# Patient Record
Sex: FEMALE | Race: WHITE | Employment: FULL TIME | URBAN - METROPOLITAN AREA
[De-identification: names, ages, dates, MRNs, and addresses within clinical notes are randomized per-mention and may not be internally consistent; named-entity substitution may affect disease eponyms.]

---

## 2018-12-31 ENCOUNTER — HOSPITAL ENCOUNTER (EMERGENCY)
Age: 23
Discharge: HOME | End: 2018-12-31
Attending: EMERGENCY MEDICINE

## 2018-12-31 VITALS
SYSTOLIC BLOOD PRESSURE: 125 MMHG | OXYGEN SATURATION: 98 % | RESPIRATION RATE: 16 BRPM | HEART RATE: 95 BPM | TEMPERATURE: 98.4 F | BODY MASS INDEX: 45.02 KG/M2 | WEIGHT: 222.88 LBS | DIASTOLIC BLOOD PRESSURE: 89 MMHG

## 2018-12-31 DIAGNOSIS — N39.0 ACUTE UTI: Primary | ICD-10-CM

## 2018-12-31 LAB
BACTERIA: ABNORMAL /HPF
BILIRUBIN URINE: NEGATIVE
BLOOD, URINE: NEGATIVE
CLARITY: CLEAR
COLOR: ABNORMAL
EPITHELIAL CELLS, UA: ABNORMAL /HPF
GLUCOSE URINE: NEGATIVE MG/DL
HCG(URINE) PREGNANCY TEST: NEGATIVE
KETONES, URINE: NEGATIVE MG/DL
LEUKOCYTE ESTERASE, URINE: ABNORMAL
MICROSCOPIC EXAMINATION: YES
NITRITE, URINE: NEGATIVE
PH UA: 6
PROTEIN UA: NEGATIVE MG/DL
RBC UA: ABNORMAL /HPF (ref 0–2)
SPECIFIC GRAVITY UA: 1.02
URINE REFLEX TO CULTURE: YES
URINE TYPE: ABNORMAL
UROBILINOGEN, URINE: 0.2 E.U./DL
WBC UA: ABNORMAL /HPF (ref 0–5)

## 2018-12-31 PROCEDURE — 87077 CULTURE AEROBIC IDENTIFY: CPT

## 2018-12-31 PROCEDURE — 84703 CHORIONIC GONADOTROPIN ASSAY: CPT

## 2018-12-31 PROCEDURE — 81001 URINALYSIS AUTO W/SCOPE: CPT

## 2018-12-31 PROCEDURE — 87186 SC STD MICRODIL/AGAR DIL: CPT

## 2018-12-31 PROCEDURE — 87086 URINE CULTURE/COLONY COUNT: CPT

## 2018-12-31 PROCEDURE — 99283 EMERGENCY DEPT VISIT LOW MDM: CPT

## 2018-12-31 RX ORDER — PHENAZOPYRIDINE HYDROCHLORIDE 100 MG/1
100 TABLET, FILM COATED ORAL 3 TIMES DAILY PRN
Qty: 9 TABLET | Refills: 0 | Status: SHIPPED | OUTPATIENT
Start: 2018-12-31 | End: 2019-01-03

## 2018-12-31 RX ORDER — CEFUROXIME AXETIL 250 MG/1
250 TABLET ORAL 2 TIMES DAILY
Qty: 10 TABLET | Refills: 0 | Status: SHIPPED | OUTPATIENT
Start: 2018-12-31 | End: 2019-01-05

## 2019-01-03 LAB
ORGANISM: ABNORMAL
URINE CULTURE, ROUTINE: ABNORMAL
URINE CULTURE, ROUTINE: ABNORMAL

## 2019-04-24 ENCOUNTER — APPOINTMENT (OUTPATIENT)
Dept: GENERAL RADIOLOGY | Age: 24
End: 2019-04-24
Payer: MEDICAID

## 2019-04-24 ENCOUNTER — HOSPITAL ENCOUNTER (EMERGENCY)
Age: 24
Discharge: HOME | End: 2019-04-25
Attending: EMERGENCY MEDICINE
Payer: MEDICAID

## 2019-04-24 DIAGNOSIS — S82.851A CLOSED RIGHT TRIMALLEOLAR FRACTURE, INITIAL ENCOUNTER: ICD-10-CM
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
## 2019-04-24 LAB
ANION GAP SERPL CALCULATED.3IONS-SCNC: 11 MMOL/L (ref 3–16)
BUN BLDV-MCNC: 17 MG/DL (ref 7–20)
CALCIUM SERPL-MCNC: 9.5 MG/DL (ref 8.3–10.6)
CHLORIDE BLD-SCNC: 102 MMOL/L (ref 99–110)
CO2: 24 MMOL/L (ref 21–32)
CREAT SERPL-MCNC: 0.7 MG/DL (ref 0.6–1.1)
GFR AFRICAN AMERICAN: >60
GFR NON-AFRICAN AMERICAN: >60
GLUCOSE BLD-MCNC: 124 MG/DL (ref 70–99)
HCG QUALITATIVE: NEGATIVE
HCT VFR BLD CALC: 40.2 % (ref 36–48)
HEMOGLOBIN: 12.9 G/DL (ref 12–16)
MCH RBC QN AUTO: 26.9 PG (ref 26–34)
MCHC RBC AUTO-ENTMCNC: 32.1 G/DL (ref 31–36)
MCV RBC AUTO: 83.7 FL (ref 80–100)
PDW BLD-RTO: 14.4 % (ref 12.4–15.4)
PLATELET # BLD: 365 K/UL (ref 135–450)
PMV BLD AUTO: 7.8 FL (ref 5–10.5)
POTASSIUM SERPL-SCNC: 4.7 MMOL/L (ref 3.5–5.1)
RBC # BLD: 4.81 M/UL (ref 4–5.2)
SODIUM BLD-SCNC: 137 MMOL/L (ref 136–145)
WBC # BLD: 14.1 K/UL (ref 4–11)

## 2019-04-24 PROCEDURE — 96375 TX/PRO/DX INJ NEW DRUG ADDON: CPT

## 2019-04-24 PROCEDURE — 99284 EMERGENCY DEPT VISIT MOD MDM: CPT

## 2019-04-24 PROCEDURE — 6360000002 HC RX W HCPCS: Performed by: EMERGENCY MEDICINE

## 2019-04-24 PROCEDURE — 96374 THER/PROPH/DIAG INJ IV PUSH: CPT

## 2019-04-24 PROCEDURE — 73630 X-RAY EXAM OF FOOT: CPT

## 2019-04-24 PROCEDURE — 4500000024 HC ED LEVEL 4 PROCEDURE

## 2019-04-24 PROCEDURE — 85027 COMPLETE CBC AUTOMATED: CPT

## 2019-04-24 PROCEDURE — 80048 BASIC METABOLIC PNL TOTAL CA: CPT

## 2019-04-24 PROCEDURE — 36415 COLL VENOUS BLD VENIPUNCTURE: CPT

## 2019-04-24 PROCEDURE — 73560 X-RAY EXAM OF KNEE 1 OR 2: CPT

## 2019-04-24 PROCEDURE — 73502 X-RAY EXAM HIP UNI 2-3 VIEWS: CPT

## 2019-04-24 PROCEDURE — 73610 X-RAY EXAM OF ANKLE: CPT

## 2019-04-24 PROCEDURE — 84703 CHORIONIC GONADOTROPIN ASSAY: CPT

## 2019-04-24 RX ORDER — MORPHINE SULFATE 4 MG/ML
4 INJECTION, SOLUTION INTRAMUSCULAR; INTRAVENOUS ONCE
Status: COMPLETED | OUTPATIENT
Start: 2019-04-24 | End: 2019-04-24

## 2019-04-24 RX ORDER — ONDANSETRON 2 MG/ML
4 INJECTION INTRAMUSCULAR; INTRAVENOUS ONCE
Status: COMPLETED | OUTPATIENT
Start: 2019-04-24 | End: 2019-04-24

## 2019-04-24 RX ADMIN — ONDANSETRON 4 MG: 2 INJECTION INTRAMUSCULAR; INTRAVENOUS at 23:13

## 2019-04-24 RX ADMIN — MORPHINE SULFATE 4 MG: 4 INJECTION INTRAVENOUS at 23:13

## 2019-04-24 ASSESSMENT — PAIN DESCRIPTION - FREQUENCY: FREQUENCY: CONTINUOUS

## 2019-04-24 ASSESSMENT — PAIN DESCRIPTION - ONSET: ONSET: GRADUAL

## 2019-04-24 ASSESSMENT — PAIN SCALES - GENERAL
PAINLEVEL_OUTOF10: 10
PAINLEVEL_OUTOF10: 10

## 2019-04-24 ASSESSMENT — PAIN DESCRIPTION - PAIN TYPE: TYPE: ACUTE PAIN

## 2019-04-24 ASSESSMENT — PAIN DESCRIPTION - ORIENTATION: ORIENTATION: RIGHT

## 2019-04-24 ASSESSMENT — PAIN DESCRIPTION - LOCATION: LOCATION: ANKLE

## 2019-04-24 ASSESSMENT — PAIN DESCRIPTION - DESCRIPTORS: DESCRIPTORS: ACHING;THROBBING

## 2019-04-24 ASSESSMENT — PAIN DESCRIPTION - PROGRESSION: CLINICAL_PROGRESSION: GRADUALLY WORSENING

## 2019-04-25 ENCOUNTER — APPOINTMENT (OUTPATIENT)
Dept: GENERAL RADIOLOGY | Age: 24
End: 2019-04-25
Payer: MEDICAID

## 2019-04-25 ENCOUNTER — TELEPHONE (OUTPATIENT)
Dept: ORTHOPEDIC SURGERY | Age: 24
End: 2019-04-25

## 2019-04-25 PROCEDURE — 73610 X-RAY EXAM OF ANKLE: CPT

## 2019-04-25 PROCEDURE — 6360000002 HC RX W HCPCS

## 2019-04-25 PROCEDURE — 73590 X-RAY EXAM OF LOWER LEG: CPT

## 2019-04-25 PROCEDURE — 96376 TX/PRO/DX INJ SAME DRUG ADON: CPT

## 2019-04-25 PROCEDURE — 6360000002 HC RX W HCPCS: Performed by: EMERGENCY MEDICINE

## 2019-04-25 PROCEDURE — 6370000000 HC RX 637 (ALT 250 FOR IP): Performed by: EMERGENCY MEDICINE

## 2019-04-25 PROCEDURE — 96375 TX/PRO/DX INJ NEW DRUG ADDON: CPT

## 2019-04-25 RX ORDER — LORAZEPAM 2 MG/ML
2 INJECTION INTRAMUSCULAR ONCE
Status: COMPLETED | OUTPATIENT
Start: 2019-04-25 | End: 2019-04-25

## 2019-04-25 RX ORDER — MORPHINE SULFATE 4 MG/ML
4 INJECTION, SOLUTION INTRAMUSCULAR; INTRAVENOUS ONCE
Status: DISCONTINUED | OUTPATIENT
Start: 2019-04-25 | End: 2019-04-25

## 2019-04-25 RX ORDER — HYDROCODONE BITARTRATE AND ACETAMINOPHEN 5; 325 MG/1; MG/1
1 TABLET ORAL EVERY 4 HOURS PRN
Qty: 8 TABLET | Refills: 0 | Status: SHIPPED | OUTPATIENT
Start: 2019-04-25 | End: 2019-04-28

## 2019-04-25 RX ORDER — HYDROCODONE BITARTRATE AND ACETAMINOPHEN 5; 325 MG/1; MG/1
1 TABLET ORAL ONCE
Status: COMPLETED | OUTPATIENT
Start: 2019-04-25 | End: 2019-04-25

## 2019-04-25 RX ORDER — MORPHINE SULFATE 4 MG/ML
4 INJECTION, SOLUTION INTRAMUSCULAR; INTRAVENOUS ONCE
Status: COMPLETED | OUTPATIENT
Start: 2019-04-25 | End: 2019-04-25

## 2019-04-25 RX ORDER — MORPHINE SULFATE 4 MG/ML
2 INJECTION, SOLUTION INTRAMUSCULAR; INTRAVENOUS ONCE
Status: COMPLETED | OUTPATIENT
Start: 2019-04-25 | End: 2019-04-25

## 2019-04-25 RX ORDER — ONDANSETRON 4 MG/1
4 TABLET, ORALLY DISINTEGRATING ORAL EVERY 8 HOURS PRN
Qty: 20 TABLET | Refills: 0 | Status: SHIPPED | OUTPATIENT
Start: 2019-04-25 | End: 2020-02-16

## 2019-04-25 RX ORDER — LORAZEPAM 2 MG/ML
INJECTION INTRAMUSCULAR
Status: COMPLETED
Start: 2019-04-25 | End: 2019-04-25

## 2019-04-25 RX ADMIN — MORPHINE SULFATE 2 MG: 4 INJECTION INTRAVENOUS at 03:25

## 2019-04-25 RX ADMIN — HYDROCODONE BITARTRATE AND ACETAMINOPHEN 1 TABLET: 5; 325 TABLET ORAL at 08:42

## 2019-04-25 RX ADMIN — LORAZEPAM 2 MG: 2 INJECTION INTRAMUSCULAR; INTRAVENOUS at 03:37

## 2019-04-25 RX ADMIN — MORPHINE SULFATE 4 MG: 4 INJECTION INTRAVENOUS at 03:50

## 2019-04-25 RX ADMIN — MORPHINE SULFATE 4 MG: 4 INJECTION INTRAVENOUS at 02:08

## 2019-04-25 RX ADMIN — LORAZEPAM 2 MG: 2 INJECTION INTRAMUSCULAR; INTRAVENOUS at 03:44

## 2019-04-25 RX ADMIN — LORAZEPAM 2 MG: 2 INJECTION INTRAMUSCULAR; INTRAVENOUS at 03:28

## 2019-04-25 ASSESSMENT — PAIN - FUNCTIONAL ASSESSMENT: PAIN_FUNCTIONAL_ASSESSMENT: 0-10

## 2019-04-25 ASSESSMENT — PAIN SCALES - GENERAL
PAINLEVEL_OUTOF10: 8
PAINLEVEL_OUTOF10: 9
PAINLEVEL_OUTOF10: 8

## 2019-04-25 ASSESSMENT — ENCOUNTER SYMPTOMS
SHORTNESS OF BREATH: 0
COLOR CHANGE: 0
PHOTOPHOBIA: 0
ABDOMINAL PAIN: 0
COUGH: 0
TROUBLE SWALLOWING: 0
VOMITING: 0
SORE THROAT: 0

## 2019-04-25 NOTE — ED NOTES
Pt awake moving around in bed saying she has to go to the bathroom and saying her leg hurts. Brought commode to bedside and myself and pt mother helped her on commode. Pt very tearful.       Cayden Lee RN  04/25/19 4276

## 2019-04-25 NOTE — ED NOTES
Went back into room to instruct pt on crutch use. Pt able to safely demonstrate crutch use. Pt falling over and saying she can't do it and that she does not want the crutches. Pt mother says that they have a wheelchair at home that pt can use. Crutches were not given to pt. Dr. Alaina Fowler notified.       Tonio Servin RN  04/25/19 9903

## 2019-04-25 NOTE — ED NOTES
Pt on the phone with her mom, pt again refused help from women helping women      Talita Bello RN  04/24/19 1139

## 2019-04-25 NOTE — ED NOTES
Warm blanket given. Pt repositioned.  Lights dimmed     Robyn Mixon, [facility]  04/25/19 3688
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
## 2019-04-25 NOTE — ED NOTES
Pt very drowsy. Mom at bedside. Repositioned. Warm blanket given and lights dimmed.      Ginna Alva RN  04/25/19 8167

## 2019-04-25 NOTE — ED NOTES
Took discharge papers, crutches and wheelchair into room to d/c pt. Pt talking on phone. Tried to instruct pt on crutches and go over d/c instructions but pt still talking on phone. Mother in room. Told pt and mother to let me know when they are ready to be discharged.       Valentino Carlisle RN  04/25/19 8752

## 2019-04-26 ENCOUNTER — ANESTHESIA EVENT (OUTPATIENT)
Dept: OPERATING ROOM | Age: 24
End: 2019-04-26
Payer: MEDICAID

## 2019-04-26 ENCOUNTER — OFFICE VISIT (OUTPATIENT)
Dept: ORTHOPEDIC SURGERY | Age: 24
End: 2019-04-26
Payer: MEDICAID

## 2019-04-26 VITALS — HEIGHT: 59 IN | BODY MASS INDEX: 45.96 KG/M2 | RESPIRATION RATE: 16 BRPM | WEIGHT: 228 LBS

## 2019-04-26 DIAGNOSIS — S93.431A SYNDESMOTIC DISRUPTION OF RIGHT ANKLE, INITIAL ENCOUNTER: ICD-10-CM

## 2019-04-26 DIAGNOSIS — S82.431A CLOSED DISPLACED OBLIQUE FRACTURE OF SHAFT OF RIGHT FIBULA, INITIAL ENCOUNTER: ICD-10-CM

## 2019-04-26 DIAGNOSIS — S82.841A CLOSED BIMALLEOLAR FRACTURE OF RIGHT ANKLE, INITIAL ENCOUNTER: Primary | ICD-10-CM

## 2019-04-26 PROCEDURE — 99203 OFFICE O/P NEW LOW 30 MIN: CPT | Performed by: ORTHOPAEDIC SURGERY

## 2019-04-26 NOTE — PROGRESS NOTES
[facility] time_0850___________        Surgery time_1050___________    Take the following medications with a sip of water:  Norco, Zofran PRN    Do not eat or drink anything after 12:00 midnight prior to your surgery. This includes water chewing gum, mints and ice chips. You may brush your teeth and gargle the morning of your surgery, but do not swallow the water     Please see your family doctor/pediatrician for a history and physical and/or concerning medications. Bring any test results/reports from your physicians office. If you are under the care of a heart doctor or specialist doctor, please be aware that you may be asked to them for clearance    You may be asked to stop blood thinners such as Coumadin, Plavix, Fragmin, Lovenox, etc., or any anti-inflammatories such as:  Aspirin, Ibuprofen, Advil, Naproxen prior to your surgery. We also ask that you stop any OTC medications such as fish oil, vitamin E, glucosamine, garlic, Multivitamins, COQ 10, etc.    We ask that you do not smoke 24 hours prior to surgery  We ask that you do not  drink any alcoholic beverages 24 hours prior to surgery     You must make arrangements for a responsible adult to take you home after your surgery. For your safety you will not be allowed to leave alone or drive yourself home. Your surgery will be cancelled if you do not have a ride home. Also for your safety, it is strongly suggested that someone stay with you the first 24 hours after your surgery. A parent or legal guardian must accompany a child scheduled for surgery and plan to stay at the hospital until the child is discharged. Please do not bring other children with you. For your comfort, please wear simple loose fitting clothing to the hospital.  Please do not bring valuables.     Do not wear any make-up or nail polish on your fingers or toes      For your safety, please do not wear any jewelry or body piercing's on the day of surgery. All jewelry must be removed. If you have dentures, they will be removed before going to operating room. For your convenience, we will provide you with a container. If you wear contact lenses or glasses, they will be removed, please bring a case for them. If you have a living will and a durable power of  for healthcare, please bring in a copy. As part of our patient safety program to minimize surgical site infections, we ask you to do the following:    · Please notify your surgeon if you develop any illness between         now and the  day of your surgery. · This includes a cough, cold, fever, sore throat, nausea,         or vomiting, and diarrhea, etc.  ·  Please notify your surgeon if you experience dizziness, shortness         of breath or blurred vision between now and the time of your surgery. Do not shave your operative site 96 hours prior to surgery. For face and neck surgery, men may use an electric razor 48 hours   prior to surgery. You may shower the night before surgery or the morning of   your surgery with an antibacterial soap. You will need to bring a photo ID and insurance card    [facility] has an onsite pharmacy, would you like to utilize our pharmacy     If you will be staying overnight and use a C-pap machine, please bring   your C-pap to hospital     Our goal is to provide you with excellent care, therefore, visitors will be limited to two(2) in the room at a time so that we may focus on providing this care for you. Please contact pre-admission testing if you have any further questions. [facility] phone number:  [phone] [facility] fax number:  [phone]  Please note these are generalized instructions for all surgical cases, you may be provided with more specific instructions according to your surgery.
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
## 2019-04-26 NOTE — LETTER
[address] [place] [address] Ortho & Spine  Surgery Scheduling Form:      DEMOGRAPHICS:                                                                                                              .    Patient Name:  Asia Campbell  Patient :  1995   Patient SS#:      Patient Phone:  [phone] (home)  Alt. Patient Phone:    Patient Address:  [facility] [address]    PCP:  No primary care provider on file. Insurance:  SELF PAY  Insurance ID Number:    DIAGNOSIS & PROCEDURE:                                                                                            .  Diagnosis:   Right fibula shaft fracture S82.401A  Operation:  ORIF right fibular shaft [id number]  Location:  [place]  Surgeon:  Wendie Nelson MD    SCHEDULING INFORMATION:                                                                                         .  Surgeon's Scheduling Instruction:  2019  Requested Date:  2019  OR Time:  10:30am Patient Arrival Time:  8:30amOR Time Required:  60  Minutes  Anesthesia:  General    SA Required:  Yes  Equipment:  Ally  Mini C-Arm:  No    Standard C-Arm:  Yes  Status:  Outpatient    PAT Required:  Yes  Latex Allergy:  no    Defibrilator or Pacemaker:  no and unknown  Isolation Precautions:  no  Comments:                       Melisa Jones MD 19   BILLING INFORMATION:                                                                                                    .    Procedure:       CPT Code Modifier                  Pre-Certification:                ,a

## 2019-04-27 PROBLEM — S82.841A CLOSED BIMALLEOLAR FRACTURE OF RIGHT ANKLE: Status: ACTIVE | Noted: 2019-04-27

## 2019-04-27 PROBLEM — S82.431A CLOSED DISPLACED OBLIQUE FRACTURE OF SHAFT OF RIGHT FIBULA: Status: ACTIVE | Noted: 2019-04-27

## 2019-04-27 PROBLEM — S93.431A SYNDESMOTIC DISRUPTION OF RIGHT ANKLE: Status: ACTIVE | Noted: 2019-04-27

## 2019-04-27 RX ORDER — HYDROCODONE BITARTRATE AND ACETAMINOPHEN 5; 325 MG/1; MG/1
1 TABLET ORAL EVERY 6 HOURS PRN
Qty: 28 TABLET | Refills: 0 | Status: SHIPPED | OUTPATIENT
Start: 2019-04-27 | End: 2019-05-04

## 2019-04-27 RX ORDER — CEPHALEXIN 500 MG/1
500 CAPSULE ORAL 4 TIMES DAILY
Qty: 20 CAPSULE | Refills: 0 | Status: SHIPPED | OUTPATIENT
Start: 2019-04-27 | End: 2020-02-16

## 2019-04-29 ENCOUNTER — APPOINTMENT (OUTPATIENT)
Dept: GENERAL RADIOLOGY | Age: 24
End: 2019-04-29
Attending: ORTHOPAEDIC SURGERY
Payer: MEDICAID

## 2019-04-29 ENCOUNTER — HOSPITAL ENCOUNTER (OUTPATIENT)
Age: 24
Setting detail: OUTPATIENT SURGERY
Discharge: HOME | End: 2019-04-29
Attending: ORTHOPAEDIC SURGERY | Admitting: ORTHOPAEDIC SURGERY
Payer: MEDICAID

## 2019-04-29 ENCOUNTER — ANESTHESIA (OUTPATIENT)
Dept: OPERATING ROOM | Age: 24
End: 2019-04-29
Payer: MEDICAID

## 2019-04-29 VITALS
HEIGHT: 59 IN | WEIGHT: 228 LBS | SYSTOLIC BLOOD PRESSURE: 132 MMHG | DIASTOLIC BLOOD PRESSURE: 78 MMHG | HEART RATE: 70 BPM | RESPIRATION RATE: 14 BRPM | TEMPERATURE: 98 F | BODY MASS INDEX: 45.96 KG/M2 | OXYGEN SATURATION: 98 %

## 2019-04-29 VITALS
DIASTOLIC BLOOD PRESSURE: 68 MMHG | TEMPERATURE: 98.6 F | RESPIRATION RATE: 28 BRPM | OXYGEN SATURATION: 100 % | SYSTOLIC BLOOD PRESSURE: 125 MMHG

## 2019-04-29 LAB — PREGNANCY, URINE: NEGATIVE

## 2019-04-29 PROCEDURE — 7100000001 HC PACU RECOVERY - ADDTL 15 MIN: Performed by: ORTHOPAEDIC SURGERY

## 2019-04-29 PROCEDURE — 2720000010 HC SURG SUPPLY STERILE: Performed by: ORTHOPAEDIC SURGERY

## 2019-04-29 PROCEDURE — 7100000010 HC PHASE II RECOVERY - FIRST 15 MIN: Performed by: ORTHOPAEDIC SURGERY

## 2019-04-29 PROCEDURE — 2500000003 HC RX 250 WO HCPCS: Performed by: ORTHOPAEDIC SURGERY

## 2019-04-29 PROCEDURE — 6370000000 HC RX 637 (ALT 250 FOR IP): Performed by: ANESTHESIOLOGY

## 2019-04-29 PROCEDURE — 6360000002 HC RX W HCPCS: Performed by: ANESTHESIOLOGY

## 2019-04-29 PROCEDURE — 6360000002 HC RX W HCPCS: Performed by: NURSE ANESTHETIST, CERTIFIED REGISTERED

## 2019-04-29 PROCEDURE — 2709999900 HC NON-CHARGEABLE SUPPLY: Performed by: ORTHOPAEDIC SURGERY

## 2019-04-29 PROCEDURE — C1713 ANCHOR/SCREW BN/BN,TIS/BN: HCPCS | Performed by: ORTHOPAEDIC SURGERY

## 2019-04-29 PROCEDURE — 3600000004 HC SURGERY LEVEL 4 BASE: Performed by: ORTHOPAEDIC SURGERY

## 2019-04-29 PROCEDURE — 73600 X-RAY EXAM OF ANKLE: CPT

## 2019-04-29 PROCEDURE — 7100000000 HC PACU RECOVERY - FIRST 15 MIN: Performed by: ORTHOPAEDIC SURGERY

## 2019-04-29 PROCEDURE — 27784 TREATMENT OF FIBULA FRACTURE: CPT | Performed by: ORTHOPAEDIC SURGERY

## 2019-04-29 PROCEDURE — 84703 CHORIONIC GONADOTROPIN ASSAY: CPT

## 2019-04-29 PROCEDURE — 2580000003 HC RX 258: Performed by: ANESTHESIOLOGY

## 2019-04-29 PROCEDURE — 3700000000 HC ANESTHESIA ATTENDED CARE: Performed by: ORTHOPAEDIC SURGERY

## 2019-04-29 PROCEDURE — 3600000014 HC SURGERY LEVEL 4 ADDTL 15MIN: Performed by: ORTHOPAEDIC SURGERY

## 2019-04-29 PROCEDURE — 7100000011 HC PHASE II RECOVERY - ADDTL 15 MIN: Performed by: ORTHOPAEDIC SURGERY

## 2019-04-29 PROCEDURE — 27829 TREAT LOWER LEG JOINT: CPT | Performed by: ORTHOPAEDIC SURGERY

## 2019-04-29 PROCEDURE — 27814 TREATMENT OF ANKLE FRACTURE: CPT | Performed by: ORTHOPAEDIC SURGERY

## 2019-04-29 PROCEDURE — 6360000002 HC RX W HCPCS: Performed by: ORTHOPAEDIC SURGERY

## 2019-04-29 PROCEDURE — 27814 TREATMENT OF ANKLE FRACTURE: CPT | Performed by: NURSE PRACTITIONER

## 2019-04-29 PROCEDURE — 3209999900 FLUORO FOR SURGICAL PROCEDURES

## 2019-04-29 PROCEDURE — 2580000003 HC RX 258: Performed by: ORTHOPAEDIC SURGERY

## 2019-04-29 PROCEDURE — 3700000001 HC ADD 15 MINUTES (ANESTHESIA): Performed by: ORTHOPAEDIC SURGERY

## 2019-04-29 PROCEDURE — 2500000003 HC RX 250 WO HCPCS: Performed by: NURSE ANESTHETIST, CERTIFIED REGISTERED

## 2019-04-29 PROCEDURE — 27829 TREAT LOWER LEG JOINT: CPT | Performed by: NURSE PRACTITIONER

## 2019-04-29 DEVICE — SCREW BNE L50MM DIA4MM CANC SELF DRL ST CANN NONLOCKING 1/2: Type: IMPLANTABLE DEVICE | Site: FIBULA | Status: FUNCTIONAL

## 2019-04-29 DEVICE — IMPLANTABLE DEVICE: Type: IMPLANTABLE DEVICE | Site: FIBULA | Status: FUNCTIONAL

## 2019-04-29 DEVICE — SCREW BNE L14MM DIA3.5MM LNG CORT S STL ST NONCANNULATED: Type: IMPLANTABLE DEVICE | Site: FIBULA | Status: FUNCTIONAL

## 2019-04-29 DEVICE — SCREW BNE UNIV L12MM DIA3.5MM CORT S STL ST NONCANNULATED: Type: IMPLANTABLE DEVICE | Site: FIBULA | Status: FUNCTIONAL

## 2019-04-29 DEVICE — SCREW BNE L50MM DIA3.5MM SM HEX HD DIA2.5MM CORT S STL ST: Type: IMPLANTABLE DEVICE | Site: FIBULA | Status: FUNCTIONAL

## 2019-04-29 DEVICE — SCREW BNE L14MM DIA2.7MM SM HEX HD DIA2.5MM CORT S STL ST: Type: IMPLANTABLE DEVICE | Site: FIBULA | Status: FUNCTIONAL

## 2019-04-29 DEVICE — PLATE BNE L97MM 8 H BILAT S STL 1/3 TBLR FOR 3.5MM SCR: Type: IMPLANTABLE DEVICE | Site: FIBULA | Status: FUNCTIONAL

## 2019-04-29 RX ORDER — DEXAMETHASONE SODIUM PHOSPHATE 4 MG/ML
INJECTION, SOLUTION INTRA-ARTICULAR; INTRALESIONAL; INTRAMUSCULAR; INTRAVENOUS; SOFT TISSUE PRN
Status: DISCONTINUED | OUTPATIENT
Start: 2019-04-29 | End: 2019-04-29 | Stop reason: SDUPTHER

## 2019-04-29 RX ORDER — KETOROLAC TROMETHAMINE 30 MG/ML
INJECTION, SOLUTION INTRAMUSCULAR; INTRAVENOUS PRN
Status: DISCONTINUED | OUTPATIENT
Start: 2019-04-29 | End: 2019-04-29 | Stop reason: SDUPTHER

## 2019-04-29 RX ORDER — MIDAZOLAM HYDROCHLORIDE 1 MG/ML
INJECTION INTRAMUSCULAR; INTRAVENOUS PRN
Status: DISCONTINUED | OUTPATIENT
Start: 2019-04-29 | End: 2019-04-29 | Stop reason: SDUPTHER

## 2019-04-29 RX ORDER — PROPOFOL 10 MG/ML
INJECTION, EMULSION INTRAVENOUS PRN
Status: DISCONTINUED | OUTPATIENT
Start: 2019-04-29 | End: 2019-04-29 | Stop reason: SDUPTHER

## 2019-04-29 RX ORDER — SODIUM CHLORIDE 9 MG/ML
INJECTION, SOLUTION INTRAVENOUS CONTINUOUS
Status: DISCONTINUED | OUTPATIENT
Start: 2019-04-29 | End: 2019-04-29 | Stop reason: HOSPADM

## 2019-04-29 RX ORDER — FENTANYL CITRATE 50 UG/ML
25 INJECTION, SOLUTION INTRAMUSCULAR; INTRAVENOUS EVERY 5 MIN PRN
Status: DISCONTINUED | OUTPATIENT
Start: 2019-04-29 | End: 2019-04-29 | Stop reason: HOSPADM

## 2019-04-29 RX ORDER — ONDANSETRON 2 MG/ML
INJECTION INTRAMUSCULAR; INTRAVENOUS PRN
Status: DISCONTINUED | OUTPATIENT
Start: 2019-04-29 | End: 2019-04-29 | Stop reason: SDUPTHER

## 2019-04-29 RX ORDER — SUCCINYLCHOLINE/SOD CL,ISO/PF 200MG/10ML
SYRINGE (ML) INTRAVENOUS PRN
Status: DISCONTINUED | OUTPATIENT
Start: 2019-04-29 | End: 2019-04-29 | Stop reason: SDUPTHER

## 2019-04-29 RX ORDER — SODIUM CHLORIDE 0.9 % (FLUSH) 0.9 %
10 SYRINGE (ML) INJECTION PRN
Status: DISCONTINUED | OUTPATIENT
Start: 2019-04-29 | End: 2019-04-29 | Stop reason: HOSPADM

## 2019-04-29 RX ORDER — BUPIVACAINE HYDROCHLORIDE 5 MG/ML
INJECTION, SOLUTION EPIDURAL; INTRACAUDAL
Status: COMPLETED | OUTPATIENT
Start: 2019-04-29 | End: 2019-04-29

## 2019-04-29 RX ORDER — SODIUM CHLORIDE 0.9 % (FLUSH) 0.9 %
10 SYRINGE (ML) INJECTION EVERY 12 HOURS SCHEDULED
Status: DISCONTINUED | OUTPATIENT
Start: 2019-04-29 | End: 2019-04-29 | Stop reason: HOSPADM

## 2019-04-29 RX ORDER — ONDANSETRON 2 MG/ML
4 INJECTION INTRAMUSCULAR; INTRAVENOUS
Status: DISCONTINUED | OUTPATIENT
Start: 2019-04-29 | End: 2019-04-29 | Stop reason: HOSPADM

## 2019-04-29 RX ORDER — OXYCODONE HYDROCHLORIDE AND ACETAMINOPHEN 5; 325 MG/1; MG/1
1 TABLET ORAL PRN
Status: COMPLETED | OUTPATIENT
Start: 2019-04-29 | End: 2019-04-29

## 2019-04-29 RX ORDER — FENTANYL CITRATE 50 UG/ML
INJECTION, SOLUTION INTRAMUSCULAR; INTRAVENOUS PRN
Status: DISCONTINUED | OUTPATIENT
Start: 2019-04-29 | End: 2019-04-29 | Stop reason: SDUPTHER

## 2019-04-29 RX ORDER — LIDOCAINE HYDROCHLORIDE 20 MG/ML
INJECTION, SOLUTION EPIDURAL; INFILTRATION; INTRACAUDAL; PERINEURAL PRN
Status: DISCONTINUED | OUTPATIENT
Start: 2019-04-29 | End: 2019-04-29 | Stop reason: SDUPTHER

## 2019-04-29 RX ORDER — GLYCOPYRROLATE 0.2 MG/ML
INJECTION INTRAMUSCULAR; INTRAVENOUS PRN
Status: DISCONTINUED | OUTPATIENT
Start: 2019-04-29 | End: 2019-04-29 | Stop reason: SDUPTHER

## 2019-04-29 RX ORDER — OXYCODONE HYDROCHLORIDE AND ACETAMINOPHEN 5; 325 MG/1; MG/1
2 TABLET ORAL PRN
Status: COMPLETED | OUTPATIENT
Start: 2019-04-29 | End: 2019-04-29

## 2019-04-29 RX ORDER — MAGNESIUM HYDROXIDE 1200 MG/15ML
LIQUID ORAL CONTINUOUS PRN
Status: COMPLETED | OUTPATIENT
Start: 2019-04-29 | End: 2019-04-29

## 2019-04-29 RX ADMIN — MIDAZOLAM 2 MG: 1 INJECTION INTRAMUSCULAR; INTRAVENOUS at 11:57

## 2019-04-29 RX ADMIN — ONDANSETRON 4 MG: 2 INJECTION INTRAMUSCULAR; INTRAVENOUS at 12:05

## 2019-04-29 RX ADMIN — DEXAMETHASONE SODIUM PHOSPHATE 8 MG: 4 INJECTION, SOLUTION INTRAMUSCULAR; INTRAVENOUS at 12:05

## 2019-04-29 RX ADMIN — HYDROMORPHONE HYDROCHLORIDE 0.5 MG: 1 INJECTION, SOLUTION INTRAMUSCULAR; INTRAVENOUS; SUBCUTANEOUS at 14:45

## 2019-04-29 RX ADMIN — HYDROMORPHONE HYDROCHLORIDE 0.5 MG: 1 INJECTION, SOLUTION INTRAMUSCULAR; INTRAVENOUS; SUBCUTANEOUS at 15:36

## 2019-04-29 RX ADMIN — SODIUM CHLORIDE: 9 INJECTION, SOLUTION INTRAVENOUS at 10:20

## 2019-04-29 RX ADMIN — HYDROMORPHONE HYDROCHLORIDE 0.5 MG: 1 INJECTION, SOLUTION INTRAMUSCULAR; INTRAVENOUS; SUBCUTANEOUS at 14:21

## 2019-04-29 RX ADMIN — KETOROLAC TROMETHAMINE 30 MG: 30 INJECTION, SOLUTION INTRAMUSCULAR at 13:40

## 2019-04-29 RX ADMIN — GLYCOPYRROLATE 0.2 MG: 0.2 INJECTION, SOLUTION INTRAMUSCULAR; INTRAVENOUS at 12:22

## 2019-04-29 RX ADMIN — Medication 2 G: at 11:54

## 2019-04-29 RX ADMIN — FENTANYL CITRATE 100 MCG: 50 INJECTION INTRAMUSCULAR; INTRAVENOUS at 11:58

## 2019-04-29 RX ADMIN — HYDROMORPHONE HYDROCHLORIDE 0.5 MG: 1 INJECTION, SOLUTION INTRAMUSCULAR; INTRAVENOUS; SUBCUTANEOUS at 12:41

## 2019-04-29 RX ADMIN — PROPOFOL 200 MG: 10 INJECTION, EMULSION INTRAVENOUS at 11:58

## 2019-04-29 RX ADMIN — HYDROMORPHONE HYDROCHLORIDE 0.5 MG: 1 INJECTION, SOLUTION INTRAMUSCULAR; INTRAVENOUS; SUBCUTANEOUS at 12:13

## 2019-04-29 RX ADMIN — HYDROMORPHONE HYDROCHLORIDE 0.5 MG: 1 INJECTION, SOLUTION INTRAMUSCULAR; INTRAVENOUS; SUBCUTANEOUS at 14:29

## 2019-04-29 RX ADMIN — LIDOCAINE HYDROCHLORIDE 60 MG: 20 INJECTION, SOLUTION EPIDURAL; INFILTRATION; INTRACAUDAL; PERINEURAL at 11:58

## 2019-04-29 RX ADMIN — HYDROMORPHONE HYDROCHLORIDE 0.5 MG: 1 INJECTION, SOLUTION INTRAMUSCULAR; INTRAVENOUS; SUBCUTANEOUS at 15:10

## 2019-04-29 RX ADMIN — OXYCODONE HYDROCHLORIDE AND ACETAMINOPHEN 1 TABLET: 5; 325 TABLET ORAL at 16:50

## 2019-04-29 RX ADMIN — Medication 100 MG: at 11:58

## 2019-04-29 ASSESSMENT — PAIN - FUNCTIONAL ASSESSMENT
PAIN_FUNCTIONAL_ASSESSMENT: PREVENTS OR INTERFERES SOME ACTIVE ACTIVITIES AND ADLS
PAIN_FUNCTIONAL_ASSESSMENT: 0-10
PAIN_FUNCTIONAL_ASSESSMENT: PREVENTS OR INTERFERES SOME ACTIVE ACTIVITIES AND ADLS

## 2019-04-29 ASSESSMENT — PAIN DESCRIPTION - FREQUENCY
FREQUENCY: CONTINUOUS

## 2019-04-29 ASSESSMENT — PAIN DESCRIPTION - LOCATION
LOCATION: LEG

## 2019-04-29 ASSESSMENT — PULMONARY FUNCTION TESTS
PIF_VALUE: 27
PIF_VALUE: 27
PIF_VALUE: 1
PIF_VALUE: 27
PIF_VALUE: 27
PIF_VALUE: 28
PIF_VALUE: 21
PIF_VALUE: 27
PIF_VALUE: 11
PIF_VALUE: 27
PIF_VALUE: 28
PIF_VALUE: 7
PIF_VALUE: 27
PIF_VALUE: 25
PIF_VALUE: 25
PIF_VALUE: 27
PIF_VALUE: 4
PIF_VALUE: 27
PIF_VALUE: 28
PIF_VALUE: 27
PIF_VALUE: 27
PIF_VALUE: 3
PIF_VALUE: 27
PIF_VALUE: 22
PIF_VALUE: 22
PIF_VALUE: 27
PIF_VALUE: 29
PIF_VALUE: 22
PIF_VALUE: 27
PIF_VALUE: 6
PIF_VALUE: 27
PIF_VALUE: 27
PIF_VALUE: 22
PIF_VALUE: 27
PIF_VALUE: 21
PIF_VALUE: 22
PIF_VALUE: 28
PIF_VALUE: 22
PIF_VALUE: 27
PIF_VALUE: 27
PIF_VALUE: 22
PIF_VALUE: 0
PIF_VALUE: 27
PIF_VALUE: 27
PIF_VALUE: 1
PIF_VALUE: 22
PIF_VALUE: 28
PIF_VALUE: 27
PIF_VALUE: 27
PIF_VALUE: 2
PIF_VALUE: 27
PIF_VALUE: 28
PIF_VALUE: 27
PIF_VALUE: 22
PIF_VALUE: 22
PIF_VALUE: 27
PIF_VALUE: 27
PIF_VALUE: 1
PIF_VALUE: 27
PIF_VALUE: 28
PIF_VALUE: 27
PIF_VALUE: 22
PIF_VALUE: 24
PIF_VALUE: 27
PIF_VALUE: 22
PIF_VALUE: 28
PIF_VALUE: 27
PIF_VALUE: 22
PIF_VALUE: 27
PIF_VALUE: 27
PIF_VALUE: 5
PIF_VALUE: 22
PIF_VALUE: 28
PIF_VALUE: 27
PIF_VALUE: 21
PIF_VALUE: 28
PIF_VALUE: 22
PIF_VALUE: 7
PIF_VALUE: 22
PIF_VALUE: 27
PIF_VALUE: 4
PIF_VALUE: 28
PIF_VALUE: 27
PIF_VALUE: 1

## 2019-04-29 ASSESSMENT — PAIN DESCRIPTION - ONSET
ONSET: AWAKENED FROM SLEEP
ONSET: AWAKENED FROM SLEEP
ONSET: ON-GOING
ONSET: AWAKENED FROM SLEEP
ONSET: ON-GOING
ONSET: AWAKENED FROM SLEEP
ONSET: ON-GOING
ONSET: ON-GOING
ONSET: AWAKENED FROM SLEEP

## 2019-04-29 ASSESSMENT — PAIN DESCRIPTION - PAIN TYPE
TYPE: SURGICAL PAIN

## 2019-04-29 ASSESSMENT — PAIN DESCRIPTION - ORIENTATION
ORIENTATION: RIGHT

## 2019-04-29 ASSESSMENT — PAIN DESCRIPTION - DESCRIPTORS
DESCRIPTORS: ACHING;DISCOMFORT
DESCRIPTORS: ACHING
DESCRIPTORS: ACHING;DISCOMFORT
DESCRIPTORS: ACHING
DESCRIPTORS: ACHING

## 2019-04-29 ASSESSMENT — PAIN DESCRIPTION - PROGRESSION
CLINICAL_PROGRESSION: GRADUALLY WORSENING
CLINICAL_PROGRESSION: NOT CHANGED
CLINICAL_PROGRESSION: NOT CHANGED
CLINICAL_PROGRESSION: GRADUALLY IMPROVING
CLINICAL_PROGRESSION: NOT CHANGED
CLINICAL_PROGRESSION: GRADUALLY IMPROVING
CLINICAL_PROGRESSION: NOT CHANGED

## 2019-04-29 ASSESSMENT — PAIN SCALES - GENERAL
PAINLEVEL_OUTOF10: 5
PAINLEVEL_OUTOF10: 3
PAINLEVEL_OUTOF10: 9
PAINLEVEL_OUTOF10: 8
PAINLEVEL_OUTOF10: 7
PAINLEVEL_OUTOF10: 9
PAINLEVEL_OUTOF10: 8
PAINLEVEL_OUTOF10: 5
PAINLEVEL_OUTOF10: 9

## 2019-04-29 ASSESSMENT — LIFESTYLE VARIABLES: SMOKING_STATUS: 0

## 2019-04-29 ASSESSMENT — ENCOUNTER SYMPTOMS: SHORTNESS OF BREATH: 0

## 2019-04-29 NOTE — PROGRESS NOTES
Pt to PACU from OR. VSS. On 2 L of O2 nasal canula. Delfina Countdrea. Leg wrapped in ace wrap clean dry and intact. Toes are warm and pink. Cap refill less than 3. HARINDER pedal pulse. Ice applied, leg elevated. Pt asleep. Continue to monitor.

## 2019-04-29 NOTE — PROGRESS NOTES
Pt states she does not want anymore IV pain medication because \"it makes her forget to breathe\". Pt's O2 sats are > than 92 on room air. Pt states she would like to take a pain pill in phase 2. Pt is ready for transfer to phase 2.

## 2019-04-29 NOTE — PROGRESS NOTES
Pt drowsy but easy to arouse. Pt states pain is a 7/10 but she refuses any more pain medication at this point. Continue to monitor.

## 2019-04-29 NOTE — H&P
Preoperative H&P Update    The patient's History and Physical in the medical record from 4/26/2019 was reviewed by me today. Allergies   Allergen Reactions    Peanut Oil Anaphylaxis and Swelling      I reviewed the HPI, medications, allergies, reason for surgery, diagnosis and treatment plan and there has been no change. The patient was evaluated by me today. Physical exam findings for this update include:    Vitals:    04/29/19 1009   BP: 105/79   Pulse: 86   Resp: 14   Temp: 98.2 °F (36.8 °C)   SpO2: 98%     Airway is intact  Chest: chest clear, no wheezing, rales, normal symmetric air entry, no tachypnea, retractions or cyanosis  Heart: regular rate and rhythm ; heart sounds normal  Findings on exam of the body region where surgery is to be performed include:  Right ankle fracture.     Electronically signed by Brian Otero MD on 4/29/2019 at 10:29 AM

## 2019-04-29 NOTE — ANESTHESIA POSTPROCEDURE EVALUATION
Department of Anesthesiology  Postprocedure Note    Patient: Karina Vaughn  MRN: [id number]  YOB: 1995  Date of evaluation: 4/29/2019  Time:  5:32 PM     Procedure Summary     Date:  04/29/19 Room / Location:  [facility] OR 01 / [facility] OR    Anesthesia Start:  3410 Anesthesia Stop:  1087    Procedure:  OPEN REDUCTION INTERNAL FIXATION RIGHT FIBULAR SHAFT WITH C-ARM (Right Leg Lower) Diagnosis:  (RIGHT FIBULA SHAFT FRACTURE)    Surgeon:  Romeo Douglas MD Responsible Provider:  Adryan Cruz MD    Anesthesia Type:  general ASA Status:  3          Anesthesia Type: general    Nazario Phase I: Nazario Score: 9    Nazario Phase II: Nazario Score: 10    Last vitals: Reviewed and per EMR flowsheets.        Anesthesia Post Evaluation    Patient location during evaluation: PACU  Patient participation: complete - patient participated  Level of consciousness: awake and alert  Airway patency: patent  Nausea & Vomiting: no nausea and no vomiting  Complications: no  Cardiovascular status: hemodynamically stable  Respiratory status: acceptable  Hydration status: stable

## 2019-04-29 NOTE — ANESTHESIA PRE PROCEDURE
Department of Anesthesiology  Preprocedure Note       Name:  Beth Heart   Age:  27 y.o.  :  1995                                          MRN:  [id number]         Date:  2019      Surgeon: Adalid Pena):  Darrion Rogel MD    Procedure: OPEN REDUCTION INTERNAL FIXATION RIGHT FIBULAR SHAFT WITH C-ARM (Right Leg Lower)    Medications prior to admission:   Prior to Admission medications    Medication Sig Start Date End Date Taking? Authorizing Provider   cephALEXin (KEFLEX) 500 MG capsule Take 1 capsule by mouth 4 times daily 19   Darrion Rogel MD   HYDROcodone-acetaminophen (NORCO) 5-325 MG per tablet Take 1 tablet by mouth every 6 hours as needed for Pain for up to 7 days. 19  Darrion Rogel MD   ondansetron (ZOFRAN ODT) 4 MG disintegrating tablet Take 1 tablet by mouth every 8 hours as needed for Nausea 19   Jeferson Maher MD       Current medications:    No current facility-administered medications for this encounter. Allergies: Allergies   Allergen Reactions    Peanut Oil Anaphylaxis and Swelling       Problem List:    Patient Active Problem List   Diagnosis Code    Closed bimalleolar fracture of right ankle S82.841A    Syndesmotic disruption of right ankle S93.431A    Closed displaced oblique fracture of shaft of right fibula S82.431A       Past Medical History:        Diagnosis Date    Cannabis abuse     Depression     MDRO (multiple drug resistant organisms) resistance 2018    Urine Culture    PCOS (polycystic ovarian syndrome)     Suicidal ideation     Vitamin D deficiency        Past Surgical History:  History reviewed. No pertinent surgical history.     Social History:    Social History     Tobacco Use    Smoking status: Never Smoker    Smokeless tobacco: Never Used   Substance Use Topics    Alcohol use: Yes     Comment: once a year                                Counseling given: Not Answered      Vital Signs (Current):   Vitals:    19 1244   Weight: 228 lb (103.4 kg)   Height: 4' 11\" (1.499 m)                                              BP Readings from Last 3 Encounters:   04/25/19 117/76   12/31/18 125/89   06/28/18 (!) 136/100       NPO Status:   >8hrs                                                                                BMI:   Wt Readings from Last 3 Encounters:   04/26/19 228 lb (103.4 kg)   04/26/19 228 lb (103.4 kg)   04/25/19 228 lb 9.6 oz (103.7 kg)     Body mass index is 46.05 kg/m². CBC:   Lab Results   Component Value Date    WBC 14.1 04/24/2019    RBC 4.81 04/24/2019    HGB 12.9 04/24/2019    HCT 40.2 04/24/2019    MCV 83.7 04/24/2019    RDW 14.4 04/24/2019     04/24/2019       CMP:   Lab Results   Component Value Date     04/24/2019    K 4.7 04/24/2019     04/24/2019    CO2 24 04/24/2019    BUN 17 04/24/2019    CREATININE 0.7 04/24/2019    GFRAA >60 04/24/2019    LABGLOM >60 04/24/2019    GLUCOSE 124 04/24/2019    CALCIUM 9.5 04/24/2019       POC Tests: No results for input(s): POCGLU, POCNA, POCK, POCCL, POCBUN, POCHEMO, POCHCT in the last 72 hours.     Coags: No results found for: PROTIME, INR, APTT    HCG (If Applicable):   Lab Results   Component Value Date    PREGTESTUR Negative 12/31/2018        ABGs: No results found for: PHART, PO2ART, SAH2IWM, QJI4FNV, BEART, L2VRQHJK     Type & Screen (If Applicable):  No results found for: LABABO, [address]    Anesthesia Evaluation  Patient summary reviewed no history of anesthetic complications:   Airway: Mallampati: I  TM distance: >3 FB   Neck ROM: full  Mouth opening: > = 3 FB Dental:      Comment: No loose teeth    Pulmonary: breath sounds clear to auscultation      (-) COPD, asthma, shortness of breath, recent URI, sleep apnea and not a current smoker                           Cardiovascular:        (-) hypertension, valvular problems/murmurs, past MI, CAD, CABG/stent, dysrhythmias,  angina,  CHF, orthopnea and murmur      Rhythm: regular  Rate: normal                    Neuro/Psych:   (+) psychiatric history:depression/anxiety    (-) seizures, neuromuscular disease, TIA, CVA and headaches           GI/Hepatic/Renal:   (+) morbid obesity     (-) GERD, PUD, hepatitis, liver disease, no renal disease and bowel prep      ROS comment: PCOS. Endo/Other:        (-) diabetes mellitus, hypothyroidism, hyperthyroidism, blood dyscrasia, arthritis, no electrolyte abnormalities               Abdominal:           Vascular:                                      Anesthesia Plan      general     ASA 3       Induction: intravenous. MIPS: Postoperative opioids intended and Prophylactic antiemetics administered. Anesthetic plan and risks discussed with patient. Plan discussed with CRNA. This pre-anesthesia assessment may be used as a history and physical.    DOS STAFF ADDENDUM:    Pt seen and examined, chart reviewed (including anesthesia, drug and allergy history). No interval changes to history and physical examination. Anesthetic plan, risks, benefits, alternatives, and personnel involved discussed with patient. Patient verbalized an understanding and agrees to proceed.       Katie Ramirez MD  April 29, 2019  9:35 AM    Katie Ramirez MD   4/29/2019

## 2019-04-30 DIAGNOSIS — S93.431A SYNDESMOTIC DISRUPTION OF RIGHT ANKLE, INITIAL ENCOUNTER: ICD-10-CM

## 2019-04-30 DIAGNOSIS — S82.841A CLOSED BIMALLEOLAR FRACTURE OF RIGHT ANKLE, INITIAL ENCOUNTER: Primary | ICD-10-CM

## 2019-04-30 DIAGNOSIS — S82.431A CLOSED DISPLACED OBLIQUE FRACTURE OF SHAFT OF RIGHT FIBULA, INITIAL ENCOUNTER: ICD-10-CM

## 2019-04-30 RX ORDER — HYDROCODONE BITARTRATE AND ACETAMINOPHEN 5; 325 MG/1; MG/1
1 TABLET ORAL EVERY 6 HOURS PRN
Qty: 20 TABLET | Refills: 0 | Status: SHIPPED | OUTPATIENT
Start: 2019-04-30 | End: 2019-05-05

## 2019-04-30 NOTE — BRIEF OP NOTE
Brief Postoperative Note  ______________________________________________________________    Patient: Lm Nicole  YOB: 1995  MRN: [id number]  Date of Procedure: 4/29/2019    Pre-Op Diagnosis: RIGHT FIBULA SHAFT FRACTURE, bimalleolar fracture, syndesmosis disruption. Post-Op Diagnosis: Same       Procedure(s):  OPEN REDUCTION INTERNAL FIXATION RIGHT FIBULAR SHAFT, BIMALLEOLAR FRACTURE, SYNDESMOSIS REPAIR WITH C-ARM    Anesthesia: General    Surgeon(s):  Heather Troncoso MD    Assistant: Ana Senior    Estimated Blood Loss (mL): less than 50     Complications: None    Specimens:   * No specimens in log *    Implants:  Implant Name Type Inv.  Item Serial No.  Lot No. LRB No. Used   SCREW KOSTA 1/2 THRD SELF 4.0X36MM Screw/Plate/Nail/Migel SCREW KOSTA 1/2 THRD SELF 4.0X36MM  [employer]  Right 1   SCREW KOSTA 1/2 THRD SELF 4.0X50MM Screw/Plate/Nail/Migel SCREW KOSTA 1/2 THRD SELF 4.0X50MM  [employer]  Right 1   SCREW CORTCL PERIART SLFTP 2.7X14MM Screw/Plate/Nail/Migel SCREW CORTCL PERIART SLFTP 2.7X14MM  [employer]  Right 1   PLATE TRAUMA TUBE 1/3 W/ COLLR 97MM Screw/Plate/Nail/Migel PLATE TRAUMA TUBE 1/3 W/ COLLR 97MM  [employer]  Right 1   SCREW CORTCL SM HEX 3.5X14MM Screw/Plate/Nail/Migel SCREW CORTCL SM HEX 3.5X14MM  [employer]  Right 3   SCREW CORTCL SM HEX 3.5X12MM Screw/Plate/Nail/Migel SCREW CORTCL SM HEX 3.5X12MM  [employer]  Right 2   SCREW CORTCL SM HEX 3.5X50MM Screw/Plate/Nail/Migel SCREW CORTCL SM HEX 3.5X50MM  [employer]  Right 1         Drains: * No LDAs found *    Findings: Corinne Bailey MD  Date: 4/29/2019  Time: 8:37 PM

## 2019-04-30 NOTE — CARE COORDINATION
Discharge Planning:  SW received a call from this pt stating that she is at home and is in need of medical equipment for her shower. Pt has no insurance. SW gave pt information on the [address] [place] [address] Pkwy program.  Pt stated that she will f/u re: a shower chair. No other needs noted.   Electronically signed by Manuelito Jones on 4/30/2019 at 3:11 PM

## 2019-05-01 NOTE — OP NOTE
[facility]           [address]                                OPERATIVE REPORT    PATIENT NAME: Matteo Canas                      :        1995  MED REC NO:   [id number]                          ROOM:  ACCOUNT NO:   [de-identified]                           ADMIT DATE: 2019  PROVIDER:     Han Yuan MD    DATE OF PROCEDURE:  2019    PREOPERATIVE DIAGNOSES:  1. Right ankle bimalleolar fracture, medial and posterior. 2.  Right fibular shaft comminuted displaced fracture. 3.  Right ankle distal tibiofibular syndesmosis disruption. POSTOPERATIVE DIAGNOSES:    1. Right ankle bimalleolar fracture, medial and posterior. 2.  Right fibular shaft comminuted displaced fracture. 3.  Right ankle distal tibiofibular syndesmosis disruption. OPERATION PERFORMED:    1. Open treatment right ankle bimalleolar fracture, medial and posterior. 2.  Open treatment right fibular shaft comminuted displaced fracture. 3.  Open treatment right ankle distal tibiofibular syndesmosis disruption. SURGEON:  Han Yuan MD    ASSISTANT:  Trinity Cheek CNP    ANESTHESIA:  General anesthesia. ESTIMATED BLOOD LOSS:  Minimal.    COMPLICATIONS:  None. TOURNIQUET:  Right upper thigh, 350 mmHg. IMPLANTS USED:  1. Ally eight-hole one-third tubular plate and one lag screw for the  fibular shaft. 2.  Ally 2.0 partially threaded cannulated screws x2 for the medial  malleolus. 3.  Ally 3.5 cortical screw for syndesmosis repair. INDICATIONS:  This is a 27-year-old white female who sustained a fall  with a right ankle injury. She was found to have a right ankle  bimalleolar fracture involving the medial and posterior malleolus as  well as a fibular shaft fracture with syndesmosis disruption.   All  risks, benefits, and alternatives were discussed with the patient, and  she agreed to proceed with the surgical treatment. Given the patient's Body mass index is 46.05 kg/m². added significant challenge to the procedure. It required significant physical and mental effort. It required 80% more time for such procedure. OPERATIVE PROCEDURE:  The patient's right ankle was marked. She  received 2 gm of Ancef IV preoperatively. The patient was then brought  to the operating room and underwent general anesthesia. A well-padded  tourniquet was placed, right upper thigh. The right lower extremity was  then prepped and draped in regular sterile routine fashion. A time-out  was called, confirming the patient's name, site, and procedure. Esmarch was used for exsanguination and tourniquet was inflated to 350  mmHg. A lateral incision was made over the fibular shaft fracture. Careful dissection was performed to identify and protect the superficial  peroneal nerve. There was mild comminution to the fracture, which added  significant challenge to the procedure given the patient's size as well. We carefully reduced the fracture anatomically and we placed a 2.7 lag  screw. At this point, we put the one-third tubular plate, eight-hole from  Ally in appropriate position. We then placed two screws distally and  two screws proximally. After we confirmed that the fracture was  anatomically reduced, we added one more screw proximally with a total of  three screws proximal to the fracture. At this point, before addressing  the syndesmosis, given that she has a posterior and medial malleolus  fracture, the posterior malleolus now was anatomically reduced. We then  made an incision on the medial side and that exposed the medial  malleolus fracture. There was also mild comminution as well. We  reduced it anatomically and we placed two screws and those were 4.0  partially threaded cannulated screws from Ally. That stabilized the  medial malleolus anatomically.   The bimalleolar fracture that was in the  medial and posterior was now anatomically in place. We then turned attention towards the syndesmosis repair. We used a  tenaculum bone clamp and we were able to reduce the syndesmosis  anatomically under direct visualization as well as on x-ray guidance. We then placed a 3.5 cortical screw and that stabilized the syndesmosis  anatomically. At this point, we very satisfied with the anatomic  reduction and insertion of the ankle mortise. We then let the  tourniquet down, and hemostasis was secured. We irrigated the incision  copiously with normal saline. We then closed the subcu with 2-0 and 3-0  Vicryl, and the skin with a 4-0 Monocryl. Steri-Strips were then  applied. Dressing was then applied in the form of Xeroform, 4x4,  sterile Webril, and a splint was applied. The patient tolerated the procedure well and was taken to the recovery  in stable condition. Dianna Neff CNP was 1st Assist given the nature of the procedure that needed advanced assistance. POSTOPERATIVE PLAN:  The patient will be discharged home. She will be  nonweightbearing for at least six to eight weeks. We will plan on  syndesmosis screw removal four months postoperatively.         Emily Arredondo MD    D: 04/30/2019 10:10:35       T: 04/30/2019 13:01:51     [email or web address]  Job#: [id number]     Doc#: [id number]    CC:

## 2019-05-02 VITALS
OXYGEN SATURATION: 96 % | DIASTOLIC BLOOD PRESSURE: 76 MMHG | BODY MASS INDEX: 46.17 KG/M2 | WEIGHT: 228.6 LBS | SYSTOLIC BLOOD PRESSURE: 117 MMHG | TEMPERATURE: 99.1 F | RESPIRATION RATE: 14 BRPM | HEART RATE: 96 BPM

## 2019-05-15 ENCOUNTER — OFFICE VISIT (OUTPATIENT)
Dept: ORTHOPEDIC SURGERY | Age: 24
End: 2019-05-15
Payer: MEDICAID

## 2019-05-15 VITALS — RESPIRATION RATE: 16 BRPM | BODY MASS INDEX: 45.96 KG/M2 | HEIGHT: 59 IN | WEIGHT: 228 LBS

## 2019-05-15 DIAGNOSIS — S82.431A CLOSED DISPLACED OBLIQUE FRACTURE OF SHAFT OF RIGHT FIBULA, INITIAL ENCOUNTER: ICD-10-CM

## 2019-05-15 DIAGNOSIS — S93.431A SYNDESMOTIC DISRUPTION OF RIGHT ANKLE, INITIAL ENCOUNTER: ICD-10-CM

## 2019-05-15 DIAGNOSIS — S82.841A CLOSED BIMALLEOLAR FRACTURE OF RIGHT ANKLE, INITIAL ENCOUNTER: Primary | ICD-10-CM

## 2019-05-15 PROCEDURE — APPNB30 APP NON BILLABLE TIME 0-30 MINS: Performed by: NURSE PRACTITIONER

## 2019-05-15 PROCEDURE — 99024 POSTOP FOLLOW-UP VISIT: CPT | Performed by: NURSE PRACTITIONER

## 2019-05-15 PROCEDURE — L4360 PNEUMAT WALKING BOOT PRE CST: HCPCS | Performed by: ORTHOPAEDIC SURGERY

## 2019-05-15 RX ORDER — TRAMADOL HYDROCHLORIDE 50 MG/1
50 TABLET ORAL EVERY 6 HOURS PRN
Qty: 20 TABLET | Refills: 0 | Status: SHIPPED | OUTPATIENT
Start: 2019-05-15 | End: 2019-05-20

## 2019-05-15 NOTE — PROGRESS NOTES
DIAGNOSIS:    1-Right ankle bimalleolar fracture, medial and posterior status post ORIF. 2-Right fibular shaft comminuted displaced fracture, ORIF  3-Right ankle distal tibia fibular syndesmosis disruption, status post ORIF with syndesmosis screw    DATE OF SURGERY:  4/29/2019. HISTORY OF PRESENT ILLNESS:  Ms. Armen Claros 27 y.o.  female who came in today for 2 weeks postoperative visit. The patient denies any significant pain in the right ankle. Rates pain a 6/10 VAS moderate, aching, tender, constant and are improving. Aggravating factors movement. Alleviating factors rest. She has been in a splint, and non WB. No numbness or tingling sensation. No fever or Chills. PHYSICAL EXAMINATION:  The incision healing well. No signs of any erythema or drainage, minimal swelling. She has no pain with the active or passive range of motion of the right ankle, but decrease ROM. She has intact sensation distally, and she is neurovascularly intact. IMAGING:  Three views right ankle taken today in the office showed anatomic alignment of the fracture, plate and screws in good position, no loosening. Ankle mortise is well centered. Syndesmosis screws intact. IMPRESSION:  2 weeks out from   1-Right ankle bimalleolar fracture, medial and posterior status post ORIF. 2-Right fibular shaft comminuted displaced fracture, ORIF  3-Right ankle distal tibia fibular syndesmosis disruption, status post ORIF with syndesmosis screw    PLAN: She placed in a boot, and non WB for 8 weeks. I have told the patient to work on ROM. The patient will come back for a follow up in 6 weeks. At that time, we will take 3 views of the right ankle standing. She will likely need a staged procedure for syndesmosis screw removal at 4-5 months.      Controlled substances monitoring: possible medication side effects, risk of tolerance and/or dependence, and alternative treatments discussed, no signs of potential drug abuse or diversion

## 2019-05-22 ENCOUNTER — TELEPHONE (OUTPATIENT)
Dept: ORTHOPEDIC SURGERY | Age: 24
End: 2019-05-22

## 2019-06-06 ENCOUNTER — TELEPHONE (OUTPATIENT)
Dept: ORTHOPEDIC SURGERY | Age: 24
End: 2019-06-06

## 2019-06-06 NOTE — TELEPHONE ENCOUNTER
Pt is calling about having a rash going up her leg, and clear drainage coming out of her incision. She would like someone to call her back.

## 2019-06-06 NOTE — TELEPHONE ENCOUNTER
Called and spoke to patient. She stated she is having severe itching/rash on her leg. She noticed last night while sleeping that something \"wet\" woke her up. She noticed a \"sticky clear\" drainage coming from incision. Denies fever or chills. Patient did state \" I have been kind of itchy since surgery but recently it has gotten worse. Last week I found out I was allergic to nickel\". Advised patient to be seen in office today. Patient declined for she cannot make it into office today. Appointment made with [facility] for 06/07/2019. Advised patient to keep incision clean, dry and intact putting a bandage over incision and changing it PRN if becomes soiled.  Patient in agreement to plan

## 2019-06-07 ENCOUNTER — OFFICE VISIT (OUTPATIENT)
Dept: ORTHOPEDIC SURGERY | Age: 24
End: 2019-06-07

## 2019-06-07 ENCOUNTER — TELEPHONE (OUTPATIENT)
Dept: ORTHOPEDIC SURGERY | Age: 24
End: 2019-06-07

## 2019-06-07 VITALS — BODY MASS INDEX: 45.96 KG/M2 | HEIGHT: 59 IN | WEIGHT: 228 LBS | RESPIRATION RATE: 16 BRPM

## 2019-06-07 DIAGNOSIS — S93.431A SYNDESMOTIC DISRUPTION OF RIGHT ANKLE, INITIAL ENCOUNTER: ICD-10-CM

## 2019-06-07 DIAGNOSIS — S82.841A CLOSED BIMALLEOLAR FRACTURE OF RIGHT ANKLE, INITIAL ENCOUNTER: Primary | ICD-10-CM

## 2019-06-07 DIAGNOSIS — L28.2 PRURITIC RASH: ICD-10-CM

## 2019-06-07 PROCEDURE — APPNB30 APP NON BILLABLE TIME 0-30 MINS: Performed by: NURSE PRACTITIONER

## 2019-06-07 PROCEDURE — 99024 POSTOP FOLLOW-UP VISIT: CPT | Performed by: NURSE PRACTITIONER

## 2019-06-07 RX ORDER — CEPHALEXIN 500 MG/1
500 CAPSULE ORAL 4 TIMES DAILY
Qty: 40 CAPSULE | Refills: 0 | Status: SHIPPED | OUTPATIENT
Start: 2019-06-07 | End: 2019-06-17

## 2019-06-07 NOTE — TELEPHONE ENCOUNTER
Patient states Davi Altman ref patient to an allergist and they didn't except her insurance, and requesting another referral to an allergist that will except Kateri Pallas     [phone]

## 2019-06-10 ENCOUNTER — TELEPHONE (OUTPATIENT)
Dept: ORTHOPEDIC SURGERY | Age: 24
End: 2019-06-10

## 2019-06-10 NOTE — TELEPHONE ENCOUNTER
Called and spoke to Weston Harrison. Patient went to [facility] ER on 06/08/2019 because she stated she could not take the itching anymore. Per ER note stated she had not tried OTC benadryl which was advised to her by our office. She was given a steroid by ED and was advised by ED to stop taking abx. Patient stated since she was given steroid the pus within incision has gone away. Advise patient to continue abx for prevention of infection. Advised patient she needed to contact her insurance company to find an allergist to manage this until 6 weeks where we then can remove hardware. Patient stated she would call back with fax number to send over OV notes and information.

## 2019-06-10 NOTE — TELEPHONE ENCOUNTER
Pt is calling to let dr Dinorah Muñoz know that the allergist is out until July and she would be able to see him before that.

## 2019-06-10 NOTE — TELEPHONE ENCOUNTER
Called and spoke to Isaías Palomo. Asked her if she has contacted [employer] as discussed before. Patient stated she has not. I advised patient that she needed to call insurance company and obtain a list of different allergists within her network that can get her in.  Patient in agreement to plan

## 2019-06-26 ENCOUNTER — OFFICE VISIT (OUTPATIENT)
Dept: ORTHOPEDIC SURGERY | Age: 24
End: 2019-06-26

## 2019-06-26 VITALS — BODY MASS INDEX: 45.96 KG/M2 | WEIGHT: 228 LBS | RESPIRATION RATE: 16 BRPM | HEIGHT: 59 IN

## 2019-06-26 DIAGNOSIS — L28.2 PRURITIC RASH: ICD-10-CM

## 2019-06-26 DIAGNOSIS — S82.841A CLOSED BIMALLEOLAR FRACTURE OF RIGHT ANKLE, INITIAL ENCOUNTER: Primary | ICD-10-CM

## 2019-06-26 PROCEDURE — 99024 POSTOP FOLLOW-UP VISIT: CPT | Performed by: NURSE PRACTITIONER

## 2019-06-28 NOTE — PROGRESS NOTES
DIAGNOSIS:    1-Right ankle bimalleolar fracture, medial and posterior status post ORIF. 2-Right fibular shaft comminuted displaced fracture, ORIF  3-Right ankle distal tibia fibular syndesmosis disruption, status post ORIF with syndesmosis screw  4-New pruritic rash    DATE OF SURGERY:  4/29/2019. HISTORY OF PRESENT ILLNESS:  Ms. Filiberto Lemon 27 y.o.  female who came in today for postoperative visit. She has complications of few week history of worsening puruetic rash that started in her ankle and was blistering and now has spread over her entire body. It is keeping her up at night. She just informed us that she recently found out that she is allergic to nickel because she developed a rash from jewelry and belt buckle that she was wearing. She is assuming she has a nickel allergy and has not seen a physician for this problem. She completed Keflex with some improvement in her blistered areas. She had an appointment with an allergist who canceled her appointment because he did not take her insurance but then told her this was a dermatology issue. She has not scheduled an appointment with another specialist.  She denies difficulty swallowing, no coughing but states that her throat even feels itchy. The patient denies any significant pain in the right ankle. Rates pain a 0-1/10 VAS mild tender, intermittent and are improving. Aggravating factors walking or touching the skin. Alleviating factors rest. She has been in a boot, and WB. She reports that she really has been keeping the boot off most of the time because of her itchy rash. No numbness or tingling sensation. No fever or Chills.      Past Medical History:   Diagnosis Date    Cannabis abuse     Depression     MDRO (multiple drug resistant organisms) resistance 12/31/2018    Urine Culture    PCOS (polycystic ovarian syndrome)     Suicidal ideation     Vitamin D deficiency      Past Surgical History:   Procedure Laterality Date    FIBULA FRACTURE SURGERY Right 4/29/2019    OPEN REDUCTION INTERNAL FIXATION RIGHT FIBULAR SHAFT WITH C-ARM performed by Bennett [facility]as [facility] at [facility] History   Problem Relation Age of Onset    Cancer Mother     Cancer Father     Heart Disease Father      Social History     Socioeconomic History    Marital status: Single     Spouse name: Not on file    Number of children: Not on file    Years of education: Not on file    Highest education level: Not on file   Occupational History    Not on file   Social Needs    Financial resource strain: Not on file    Food insecurity:     Worry: Not on file     Inability: Not on file    Transportation needs:     Medical: Not on file     Non-medical: Not on file   Tobacco Use    Smoking status: Never Smoker    Smokeless tobacco: Never Used   Substance and Sexual Activity    Alcohol use: Yes     Comment: once a year    Drug use: Not Currently     Types: Marijuana    Sexual activity: Yes     Partners: Male   Lifestyle    Physical activity:     Days per week: Not on file     Minutes per session: Not on file    Stress: Not on file   Relationships    Social connections:     Talks on phone: Not on file     Gets together: Not on file     Attends Druze service: Not on file     Active member of club or organization: Not on file     Attends meetings of clubs or organizations: Not on file     Relationship status: Not on file    Intimate partner violence:     Fear of current or ex partner: Not on file     Emotionally abused: Not on file     Physically abused: Not on file     Forced sexual activity: Not on file   Other Topics Concern    Not on file   Social History Narrative    Not on file     Current Outpatient Medications   Medication Sig Dispense Refill    cephALEXin (KEFLEX) 500 MG capsule Take 1 capsule by mouth 4 times daily 20 capsule 0    ondansetron (ZOFRAN ODT) 4 MG disintegrating tablet Take 1 tablet by mouth every 8 hours as needed for Nausea 20 tablet 0     No current facility-administered medications for this visit. Pertinent items are noted in HPI  Review of systems reviewed from Patient History Form dated on 5/15/2019 and available in the patient's chart under the Media tab. No change noted. PHYSICAL EXAMINATION:  Ms. Armen Claros is a very pleasant 27 y.o.  female who presents today in no acute distress, awake, alert, and oriented. She is well dressed, nourished and  groomed. Patient with normal affect. Height is  4' 11\" (1.499 m), weight is 228 lb (103.4 kg), Body mass index is 46.05 kg/m². Resting respiratory rate is 16. She ambulates nonweightbearing right leg. The incision is healing well, there is a blistered area medially that is draining clear to yellow fluid. There is a macular papular rash scattered over her entire body that is improved somewhat since last visit. No signs of any erythema or drainage, minimal swelling. She has no pain with the active or passive range of motion of the right ankle, but decreased ROM. She has intact sensation distally, and she is neurovascularly intact. Ankle reflex 1+ bilaterally. Good strength, and no instability both upper and lower extremities. IMAGING:  Three views right ankle taken today in the office showed anatomic alignment of the fracture, plate and screws in good position, no loosening. Ankle mortise is well centered. Syndesmosis screws intact. IMPRESSION:  2 months out from   1-Right ankle bimalleolar fracture, medial and posterior status post ORIF. 2-Right fibular shaft comminuted displaced fracture, ORIF  3-Right ankle distal tibia fibular syndesmosis disruption, status post ORIF with syndesmosis screw  4-New pruritic rash-stable    PLAN: I discussed the findings with the patient. She was instructed to rest and elevate. Recommend referral to allergist to confirm the nickel allergy and to suppress the allergy for the next 6 weeks if possible.  She is aware that she likely need hardware removal. She can be WBAT and avoid heavy impact activities. I discussed with the patient that I think that she would really benefit from a course of physical therapy for further strengthening and stretching. An Rx for physical therapy was given to the patient. Follow up in 6 weeks. At that time, we will take 3 views of the right ankle standing. She will likely need a staged procedure for syndesmosis screw removal at 4-5 months. I discussed the above assessment and plan with Dr. Rika Mayo who is in agreement and spent hands on time with the patient.        Kristen Geiger, APRN - CNP

## 2019-07-10 ENCOUNTER — APPOINTMENT (OUTPATIENT)
Dept: PHYSICAL THERAPY | Age: 24
End: 2019-07-10
Payer: COMMERCIAL

## 2019-07-16 ENCOUNTER — APPOINTMENT (OUTPATIENT)
Dept: PHYSICAL THERAPY | Age: 24
End: 2019-07-16
Payer: COMMERCIAL

## 2019-07-18 ENCOUNTER — HOSPITAL ENCOUNTER (OUTPATIENT)
Dept: PHYSICAL THERAPY | Age: 24
Setting detail: THERAPIES SERIES
Discharge: HOME | End: 2019-07-18
Payer: COMMERCIAL

## 2019-07-18 PROCEDURE — 97161 PT EVAL LOW COMPLEX 20 MIN: CPT | Performed by: CHIROPRACTOR

## 2019-07-18 PROCEDURE — 97530 THERAPEUTIC ACTIVITIES: CPT | Performed by: CHIROPRACTOR

## 2019-07-18 PROCEDURE — G0283 ELEC STIM OTHER THAN WOUND: HCPCS | Performed by: CHIROPRACTOR

## 2019-07-18 NOTE — PROGRESS NOTES
Goals  Short term goals  Time Frame for Short term goals: 3 weeks  Short term goal 1: Be independent with home exer  Short term goal 2: Decrease pain 25-50%  Long term goals  Time Frame for Long term goals : 6 weeks  Long term goal 1: Decrease pain 50-75%  Long term goal 2: Be able to amb with good gait pattern  Patient Goals   Patient goals : [facility] regular\"               Shell Chawla #[id number]

## 2019-07-19 ENCOUNTER — APPOINTMENT (OUTPATIENT)
Dept: PHYSICAL THERAPY | Age: 24
End: 2019-07-19
Payer: COMMERCIAL

## 2019-07-22 ENCOUNTER — HOSPITAL ENCOUNTER (OUTPATIENT)
Dept: PHYSICAL THERAPY | Age: 24
Setting detail: THERAPIES SERIES
Discharge: HOME | End: 2019-07-22
Payer: COMMERCIAL

## 2019-07-22 PROCEDURE — G0283 ELEC STIM OTHER THAN WOUND: HCPCS | Performed by: CHIROPRACTOR

## 2019-07-22 PROCEDURE — 97110 THERAPEUTIC EXERCISES: CPT | Performed by: CHIROPRACTOR

## 2019-07-22 NOTE — FLOWSHEET NOTE
Physical Therapy Daily Treatment Note  Date:  2019    Patient Name:  Roslyn Voss    :  1995  MRN: [id number]  Restrictions/Precautions:    Pertinent Medical History:  Medical/Treatment Diagnosis Information:  · Diagnosis: S/p ORIF R Bi-malleolar Fx     Insurance/Certification information:   [facility]ly [facility]  Physician Information:     Dr. Sarahi Herron of care signed (Y/N):  Inbox  Visit# / total visits:    Pain level: 5/10 at rest, up to 8/10 with activity        History of Injury:   Surgery on 19. States she is allergic to the implants and they will have to be taken out .  Has been wearing an immobilizer boot until 2 weeks ago    Subjective:  C/o R lower leg pain, decreased ankle ROM / Strength     Objective:  Observation:   Test measurements:      Exercises:  Exercise/Equipment Resistance/Repetitions Other comments        Reviewed home exer           Nu-step #7 - x 5 min         GSS/Soleus stretch 30 sec x 2    HR/TR      B x10    Gum drops PWB          Rocker board F/B, S/S   X 30 sec each    Mini-squat     In/EV x10    SLB X 20 sec     Tandem stance X 30 sec each    Tandem gait (P-bars) 4 lengths    Carioca steps in P-bars (slow) 2 lengths    BOSU Balance - 0 hands x 30 sec   march - 2 hands x 30 sec              E-stim IFC with CPx15 min           Other Therapeutic Activities:      Home Exercise Program:  Voiced understanding of home exer    Manual Treatments:      Modalities:   E-stim, CP    Timed Code Treatment Minutes:  25    Total Treatment Minutes:  40    Assessment  [x] Patient tolerated treatment well [] Patient limited by fatigue  [] Patient limited by pain  [] Patient limited by other medical complications  [] Other:         Prognosis: [x] Good [] Fair  [] Poor    Patient Requires Follow-up: [x] Yes  [] No    Plan:   [x] Continue per plan of care [] Alter current plan (see comments)  [] Plan of care initiated [] Hold pending MD visit [] Discharge  Plan for Next Session: ROM,

## 2019-07-23 ENCOUNTER — APPOINTMENT (OUTPATIENT)
Dept: PHYSICAL THERAPY | Age: 24
End: 2019-07-23
Payer: COMMERCIAL

## 2019-07-24 ENCOUNTER — HOSPITAL ENCOUNTER (OUTPATIENT)
Dept: PHYSICAL THERAPY | Age: 24
Setting detail: THERAPIES SERIES
Discharge: HOME | End: 2019-07-24
Payer: COMMERCIAL

## 2019-07-24 NOTE — FLOWSHEET NOTE
Physical Therapy  Cancellation/No-show Note  Patient Name:  Loretta Jackson  :  1995   Date:  2019  Cancelled visits to date: 1  No-shows to date: 0    For today's appointment patient:  [x]  Cancelled  []  Rescheduled appointment  []  No-show     Reason given by patient:  []  Patient ill  []  Conflicting appointment  []  No transportation    []  Conflict with work  []  No reason given  [x]  Other:     Comments:   Overslept    Electronically signed by:  Cady Alvarez

## 2019-07-26 ENCOUNTER — APPOINTMENT (OUTPATIENT)
Dept: PHYSICAL THERAPY | Age: 24
End: 2019-07-26
Payer: COMMERCIAL

## 2019-07-29 ENCOUNTER — APPOINTMENT (OUTPATIENT)
Dept: PHYSICAL THERAPY | Age: 24
End: 2019-07-29
Payer: COMMERCIAL

## 2019-07-29 ENCOUNTER — HOSPITAL ENCOUNTER (OUTPATIENT)
Dept: PHYSICAL THERAPY | Age: 24
Setting detail: THERAPIES SERIES
Discharge: HOME | End: 2019-07-29
Payer: COMMERCIAL

## 2019-07-29 PROCEDURE — G0283 ELEC STIM OTHER THAN WOUND: HCPCS

## 2019-07-29 PROCEDURE — 97110 THERAPEUTIC EXERCISES: CPT

## 2019-07-30 ENCOUNTER — APPOINTMENT (OUTPATIENT)
Dept: PHYSICAL THERAPY | Age: 24
End: 2019-07-30
Payer: COMMERCIAL

## 2019-07-31 ENCOUNTER — HOSPITAL ENCOUNTER (OUTPATIENT)
Dept: PHYSICAL THERAPY | Age: 24
Setting detail: THERAPIES SERIES
Discharge: HOME | End: 2019-07-31
Payer: COMMERCIAL

## 2019-07-31 ENCOUNTER — APPOINTMENT (OUTPATIENT)
Dept: PHYSICAL THERAPY | Age: 24
End: 2019-07-31
Payer: COMMERCIAL

## 2019-07-31 PROCEDURE — 97110 THERAPEUTIC EXERCISES: CPT | Performed by: CHIROPRACTOR

## 2019-07-31 PROCEDURE — G0283 ELEC STIM OTHER THAN WOUND: HCPCS | Performed by: CHIROPRACTOR

## 2019-08-07 ENCOUNTER — TELEPHONE (OUTPATIENT)
Dept: ORTHOPEDIC SURGERY | Age: 24
End: 2019-08-07

## 2019-08-09 ENCOUNTER — OFFICE VISIT (OUTPATIENT)
Dept: ORTHOPEDIC SURGERY | Age: 24
End: 2019-08-09
Payer: COMMERCIAL

## 2019-08-09 VITALS — BODY MASS INDEX: 45.96 KG/M2 | RESPIRATION RATE: 16 BRPM | WEIGHT: 228 LBS | HEIGHT: 59 IN

## 2019-08-09 DIAGNOSIS — S82.431A CLOSED DISPLACED OBLIQUE FRACTURE OF SHAFT OF RIGHT FIBULA, INITIAL ENCOUNTER: ICD-10-CM

## 2019-08-09 DIAGNOSIS — S93.431A SYNDESMOTIC DISRUPTION OF RIGHT ANKLE, INITIAL ENCOUNTER: ICD-10-CM

## 2019-08-09 DIAGNOSIS — S82.841A CLOSED BIMALLEOLAR FRACTURE OF RIGHT ANKLE, INITIAL ENCOUNTER: Primary | ICD-10-CM

## 2019-08-09 PROCEDURE — 99213 OFFICE O/P EST LOW 20 MIN: CPT | Performed by: ORTHOPAEDIC SURGERY

## 2019-08-09 PROCEDURE — 1036F TOBACCO NON-USER: CPT | Performed by: ORTHOPAEDIC SURGERY

## 2019-08-09 PROCEDURE — G8417 CALC BMI ABV UP PARAM F/U: HCPCS | Performed by: ORTHOPAEDIC SURGERY

## 2019-08-09 PROCEDURE — G8427 DOCREV CUR MEDS BY ELIG CLIN: HCPCS | Performed by: ORTHOPAEDIC SURGERY

## 2020-02-16 ENCOUNTER — APPOINTMENT (OUTPATIENT)
Dept: GENERAL RADIOLOGY | Age: 25
End: 2020-02-16
Payer: COMMERCIAL

## 2020-02-16 ENCOUNTER — HOSPITAL ENCOUNTER (EMERGENCY)
Age: 25
Discharge: HOME | End: 2020-02-16
Attending: EMERGENCY MEDICINE
Payer: COMMERCIAL

## 2020-02-16 VITALS
SYSTOLIC BLOOD PRESSURE: 134 MMHG | HEART RATE: 98 BPM | OXYGEN SATURATION: 100 % | RESPIRATION RATE: 19 BRPM | TEMPERATURE: 98.3 F | WEIGHT: 239.64 LBS | HEIGHT: 60 IN | BODY MASS INDEX: 47.05 KG/M2 | DIASTOLIC BLOOD PRESSURE: 71 MMHG

## 2020-02-16 LAB
ANION GAP SERPL CALCULATED.3IONS-SCNC: 12 MMOL/L (ref 3–16)
BASOPHILS ABSOLUTE: 0.1 K/UL (ref 0–0.2)
BASOPHILS RELATIVE PERCENT: 0.6 %
BUN BLDV-MCNC: 15 MG/DL (ref 7–20)
CALCIUM SERPL-MCNC: 9.7 MG/DL (ref 8.3–10.6)
CHLORIDE BLD-SCNC: 103 MMOL/L (ref 99–110)
CO2: 23 MMOL/L (ref 21–32)
CREAT SERPL-MCNC: <0.5 MG/DL (ref 0.6–1.1)
EOSINOPHILS ABSOLUTE: 0.1 K/UL (ref 0–0.6)
EOSINOPHILS RELATIVE PERCENT: 1 %
GFR AFRICAN AMERICAN: >60
GFR NON-AFRICAN AMERICAN: >60
GLUCOSE BLD-MCNC: 107 MG/DL (ref 70–99)
HCT VFR BLD CALC: 38.4 % (ref 36–48)
HEMOGLOBIN: 12.6 G/DL (ref 12–16)
LYMPHOCYTES ABSOLUTE: 2.7 K/UL (ref 1–5.1)
LYMPHOCYTES RELATIVE PERCENT: 28 %
MCH RBC QN AUTO: 27.4 PG (ref 26–34)
MCHC RBC AUTO-ENTMCNC: 32.7 G/DL (ref 31–36)
MCV RBC AUTO: 83.7 FL (ref 80–100)
MONOCYTES ABSOLUTE: 0.6 K/UL (ref 0–1.3)
MONOCYTES RELATIVE PERCENT: 6.2 %
NEUTROPHILS ABSOLUTE: 6.1 K/UL (ref 1.7–7.7)
NEUTROPHILS RELATIVE PERCENT: 64.2 %
PDW BLD-RTO: 14.1 % (ref 12.4–15.4)
PLATELET # BLD: 300 K/UL (ref 135–450)
PMV BLD AUTO: 7.9 FL (ref 5–10.5)
POTASSIUM REFLEX MAGNESIUM: 3.9 MMOL/L (ref 3.5–5.1)
PRO-BNP: 14 PG/ML (ref 0–124)
RBC # BLD: 4.59 M/UL (ref 4–5.2)
SODIUM BLD-SCNC: 138 MMOL/L (ref 136–145)
WBC # BLD: 9.5 K/UL (ref 4–11)

## 2020-02-16 PROCEDURE — 80048 BASIC METABOLIC PNL TOTAL CA: CPT

## 2020-02-16 PROCEDURE — 96372 THER/PROPH/DIAG INJ SC/IM: CPT

## 2020-02-16 PROCEDURE — 83880 ASSAY OF NATRIURETIC PEPTIDE: CPT

## 2020-02-16 PROCEDURE — 6360000002 HC RX W HCPCS: Performed by: EMERGENCY MEDICINE

## 2020-02-16 PROCEDURE — 73590 X-RAY EXAM OF LOWER LEG: CPT

## 2020-02-16 PROCEDURE — 85025 COMPLETE CBC W/AUTO DIFF WBC: CPT

## 2020-02-16 PROCEDURE — 36415 COLL VENOUS BLD VENIPUNCTURE: CPT

## 2020-02-16 PROCEDURE — 99284 EMERGENCY DEPT VISIT MOD MDM: CPT

## 2020-02-16 RX ADMIN — ENOXAPARIN SODIUM 110 MG: 60 INJECTION SUBCUTANEOUS at 08:04

## 2020-02-16 ASSESSMENT — ENCOUNTER SYMPTOMS
SORE THROAT: 0
WHEEZING: 0
CHEST TIGHTNESS: 0
ABDOMINAL PAIN: 0
STRIDOR: 0
COUGH: 0
TROUBLE SWALLOWING: 0
RHINORRHEA: 0
DIARRHEA: 0
VOMITING: 0
SHORTNESS OF BREATH: 0

## 2020-02-16 ASSESSMENT — PAIN SCALES - GENERAL
PAINLEVEL_OUTOF10: 5
PAINLEVEL_OUTOF10: 3

## 2020-02-16 ASSESSMENT — PAIN DESCRIPTION - FREQUENCY: FREQUENCY: CONTINUOUS

## 2020-02-16 ASSESSMENT — PAIN DESCRIPTION - PAIN TYPE: TYPE: ACUTE PAIN;CHRONIC PAIN

## 2020-02-16 ASSESSMENT — PAIN DESCRIPTION - ORIENTATION: ORIENTATION: RIGHT;LOWER

## 2020-02-16 ASSESSMENT — PAIN DESCRIPTION - LOCATION: LOCATION: LEG

## 2020-02-16 ASSESSMENT — PAIN DESCRIPTION - DESCRIPTORS: DESCRIPTORS: ACHING

## 2020-02-16 NOTE — ED NOTES
Pt states that there is a \"broken screw\" in her hardware right fibula     Saad Pearson, MOHAMUD  02/16/20 4605

## 2020-02-16 NOTE — ED PROVIDER NOTES
(generalized anxiety disorder)     Hypertension     Hypomanic personality disorder ([facility] [address]     MDRO (multiple drug resistant organisms) resistance 12/31/2018    Urine Culture    OCD (obsessive compulsive disorder)     PCOS (polycystic ovarian syndrome)     Suicidal ideation     Vitamin D deficiency          SURGICAL HISTORY       Past Surgical History:   Procedure Laterality Date    FIBULA FRACTURE SURGERY Right 4/29/2019    OPEN REDUCTION INTERNAL FIXATION RIGHT FIBULAR SHAFT WITH C-ARM performed by Paula Gonzalez MD at [address]       Previous Medications    No medications on file       ALLERGIES     Peanut oil and Nickel    FAMILY HISTORY       Family History   Problem Relation Age of Onset    Cancer Mother     Cancer Father     Heart Disease Father           SOCIAL HISTORY       Social History     Socioeconomic History    Marital status: Single     Spouse name: None    Number of children: None    Years of education: None    Highest education level: None   Occupational History    None   Social Needs    Financial resource strain: None    Food insecurity:     Worry: None     Inability: None    Transportation needs:     Medical: None     Non-medical: None   Tobacco Use    Smoking status: Never Smoker    Smokeless tobacco: Never Used   Substance and Sexual Activity    Alcohol use: Yes     Comment: once a year    Drug use: Not Currently     Types: Marijuana    Sexual activity: Yes     Partners: Male   Lifestyle    Physical activity:     Days per week: None     Minutes per session: None    Stress: None   Relationships    Social connections:     Talks on phone: None     Gets together: None     Attends Baptism service: None     Active member of club or organization: None     Attends meetings of clubs or organizations: None     Relationship status: None    Intimate partner violence:     Fear of current or ex partner: None     Emotionally abused: None     Physically abused: None     Forced sexual activity: None   Other Topics Concern    None   Social History Narrative    None       SCREENINGS             PHYSICAL EXAM    (up to 7 for level 4, 8 ormore for level 5)     ED Triage Vitals [20 0559]   BP Temp Temp Source Pulse Resp SpO2 Height Weight   134/71 98.3 °F (36.8 °C) Oral 98 19 100 % 5' (1.524 m) 239 lb 10.2 oz (108.7 kg)       Physical Exam  Vitals signs and nursing note reviewed. Constitutional:       General: She is not in acute distress. Appearance: She is well-developed. She is not ill-appearing, toxic-appearing or diaphoretic. Comments: Well-appearing, nontoxic, not in acute distress. Answers questions in full sentences and following verbal commands appropriately   HENT:      Head: Normocephalic and atraumatic. Eyes:      General:         Right eye: No discharge. Left eye: No discharge. Conjunctiva/sclera: Conjunctivae normal.   Neck:      Musculoskeletal: Normal range of motion and neck supple. Cardiovascular:      Rate and Rhythm: Normal rate and regular rhythm. Pulses:           Radial pulses are 2+ on the right side and 2+ on the left side. Dorsalis pedis pulses are 2+ on the right side and 2+ on the left side. Heart sounds: Normal heart sounds. No murmur. No friction rub. No gallop. Comments: Bilateral lower extremity symmetric, nonerythematous  Pulmonary:      Effort: Pulmonary effort is normal. No accessory muscle usage or respiratory distress. Breath sounds: Normal breath sounds. No decreased breath sounds, wheezing, rhonchi or rales. Chest:      Chest wall: No tenderness. Musculoskeletal: Normal range of motion. Right lower le+ Pitting Edema present. Left lower le+ Pitting Edema present. Skin:     Coloration: Skin is not pale. Neurological:      Mental Status: She is alert and oriented to person, place, and time.    Psychiatric:         Behavior: Behavior normal. Behavior is cooperative. DIAGNOSTIC RESULTS     EKG: All EKG's are interpreted by the Emergency Department Physicianwho either signs or Co-signs this chart in the absence of a cardiologist.      RADIOLOGY:   Non-plain film images such as CT, Ultrasound and MRI are read by the radiologist. Plain radiographic images are visualized and preliminarily interpreted by the emergency physician with the below findings:      Interpretation per the Radiologist below, if available at the time of this note:    XR TIBIA FIBULA RIGHT (2 VIEWS)   Preliminary Result   1. No acute abnormality of the right tibia or fibula. 2. Stable chronic healed fracture deformities of the distal right fibula and   medial malleolus near the ankle, with stable orthopedic hardware, including a   stable chronic fractured syndesmotic screw through the distal tibiofibular   joint. VL Extremity Venous Right    (Results Pending)         ED BEDSIDE ULTRASOUND:   Performed by ED Physician - none    LABS:  Labs Reviewed   BASIC METABOLIC PANEL W/ REFLEX TO MG FOR LOW K - Abnormal; Notable for the following components:       Result Value    Glucose 107 (*)     CREATININE <0.5 (*)     All other components within normal limits    Narrative:     Performed at:  [facility]  [address] [place], [facility]   Phone [phone]   CBC WITH AUTO DIFFERENTIAL    Narrative:     Performed at:  [facility]  [address] [place], [facility]   Phone [phone]   BRAIN NATRIURETIC PEPTIDE    Narrative:     Performed at:  [address] [facility] [facility]  [address] [place], [facility]   Phone [phone]       All other labs were within normal range ornot returned as of this dictation.     EMERGENCY DEPARTMENT COURSE and DIFFERENTIAL DIAGNOSIS/MDM:   Vitals:    Vitals:    02/16/20 0559   BP: 134/71   Pulse: 98   Resp: 19   Temp: 98.3 °F (36.8 °C)   TempSrc: Oral   SpO2: 100%   Weight: 239 lb 10.2 oz (108.7 kg)   Height: 5' (1.524 m)         [facility]    ED COURSE/MDM    -Kiko Webb is a 27 y.o. female with no significant medical history who presents to ED for bilateral lower extremity swelling (R>L) and RLE pain.   -Patient seen and evaluated. Oldrecords reviewed. Lab work-up was within normal limits without any evidence of heart failure, kidney or liver failure. X-ray of tib-fib shows no acute abnormality of the right tibia or fibula. Stable chronic healed fracture deformities of the distal right fibula and medial malleolus near the ankle, with stable orthopedic hardware, including a stable chronic fracture syndesmotic screw through the distal tibiofibular joint  -Labs and imaging reviewed and results discussed with patient. Noacute pathology was noted and plan for discharge home with order for outpatient venous ultrasound to r/o DVT to RLQ (was given lovenox injection prior to discharge). In the meantime encouraged RICE + ibuprofen and close follow up with PCP. Strict ED return precautions given for new/worsending symptoms. Patient in agreement withplan, verbally confirm understanding and have no further questions/concerns. REASSESSMENT      Well appearing, non toxic, alert, oriented speaking in full sentences and hemodynamically stable upon discharge      CRITICAL CARE TIME   Total Critical Care time was 0 minutes, excluding separately reportableprocedures. There was a high probability of clinicallysignificant/life threatening deterioration in the patient's condition which required my urgent intervention. CONSULTS:  None    PROCEDURES:  Unless otherwise noted below, none     Procedures    FINAL IMPRESSION      1. Right leg pain    2. Bilateral lower extremity edema          DISPOSITION/PLAN   DISPOSITION        PATIENT REFERREDTO:  No follow-up provider specified.     DISCHARGE MEDICATIONS:  New Prescriptions No medications on file          (Please note that portions of this note were completed with a voice recognition program.  Efforts were made to edit the dictations but occasionally wordsare mis-transcribed.)    Hilario Zuniga MD (electronically signed)  Attending Emergency Physician            Hilario Zuniga MD  02/16/20 2348

## 2020-02-16 NOTE — ED NOTES
Pt d/c home with AVS no s.s of distress noted script in hand for US she denies questions about f/u care     Hakan Dinero RN  02/16/20 2457

## 2020-07-26 ENCOUNTER — HOSPITAL ENCOUNTER (EMERGENCY)
Age: 25
Discharge: HOME | End: 2020-07-27
Attending: EMERGENCY MEDICINE
Payer: COMMERCIAL

## 2020-07-26 ENCOUNTER — APPOINTMENT (OUTPATIENT)
Dept: CT IMAGING | Age: 25
End: 2020-07-26
Payer: COMMERCIAL

## 2020-07-26 ENCOUNTER — APPOINTMENT (OUTPATIENT)
Dept: GENERAL RADIOLOGY | Age: 25
End: 2020-07-26
Payer: COMMERCIAL

## 2020-07-26 VITALS
RESPIRATION RATE: 24 BRPM | TEMPERATURE: 100.8 F | OXYGEN SATURATION: 96 % | HEART RATE: 95 BPM | DIASTOLIC BLOOD PRESSURE: 113 MMHG | WEIGHT: 244.49 LBS | BODY MASS INDEX: 48 KG/M2 | SYSTOLIC BLOOD PRESSURE: 132 MMHG | HEIGHT: 60 IN

## 2020-07-26 LAB
A/G RATIO: 1.2 (ref 1.1–2.2)
ALBUMIN SERPL-MCNC: 4.1 G/DL (ref 3.4–5)
ALP BLD-CCNC: 57 U/L (ref 40–129)
ALT SERPL-CCNC: 48 U/L (ref 10–40)
ANION GAP SERPL CALCULATED.3IONS-SCNC: 13 MMOL/L (ref 3–16)
AST SERPL-CCNC: 65 U/L (ref 15–37)
BASOPHILS ABSOLUTE: 0 K/UL (ref 0–0.2)
BASOPHILS RELATIVE PERCENT: 0.8 %
BILIRUB SERPL-MCNC: <0.2 MG/DL (ref 0–1)
BUN BLDV-MCNC: 12 MG/DL (ref 7–20)
CALCIUM SERPL-MCNC: 9.6 MG/DL (ref 8.3–10.6)
CHLORIDE BLD-SCNC: 104 MMOL/L (ref 99–110)
CO2: 24 MMOL/L (ref 21–32)
CREAT SERPL-MCNC: 0.7 MG/DL (ref 0.6–1.1)
EOSINOPHILS ABSOLUTE: 0 K/UL (ref 0–0.6)
EOSINOPHILS RELATIVE PERCENT: 0.8 %
GFR AFRICAN AMERICAN: >60
GFR NON-AFRICAN AMERICAN: >60
GLOBULIN: 3.5 G/DL
GLUCOSE BLD-MCNC: 128 MG/DL (ref 70–99)
HCG QUALITATIVE: NEGATIVE
HCT VFR BLD CALC: 36.1 % (ref 36–48)
HEMOGLOBIN: 11.9 G/DL (ref 12–16)
LACTIC ACID, SEPSIS: 1.5 MMOL/L (ref 0.4–1.9)
LYMPHOCYTES ABSOLUTE: 2.2 K/UL (ref 1–5.1)
LYMPHOCYTES RELATIVE PERCENT: 40.3 %
MCH RBC QN AUTO: 26 PG (ref 26–34)
MCHC RBC AUTO-ENTMCNC: 33.1 G/DL (ref 31–36)
MCV RBC AUTO: 78.7 FL (ref 80–100)
MONOCYTES ABSOLUTE: 0.4 K/UL (ref 0–1.3)
MONOCYTES RELATIVE PERCENT: 6.9 %
NEUTROPHILS ABSOLUTE: 2.7 K/UL (ref 1.7–7.7)
NEUTROPHILS RELATIVE PERCENT: 51.2 %
PDW BLD-RTO: 14.9 % (ref 12.4–15.4)
PLATELET # BLD: 329 K/UL (ref 135–450)
PMV BLD AUTO: 7.3 FL (ref 5–10.5)
POTASSIUM SERPL-SCNC: 3.9 MMOL/L (ref 3.5–5.1)
RBC # BLD: 4.58 M/UL (ref 4–5.2)
SODIUM BLD-SCNC: 141 MMOL/L (ref 136–145)
TOTAL PROTEIN: 7.6 G/DL (ref 6.4–8.2)
WBC # BLD: 5.3 K/UL (ref 4–11)

## 2020-07-26 PROCEDURE — 70450 CT HEAD/BRAIN W/O DYE: CPT

## 2020-07-26 PROCEDURE — 80053 COMPREHEN METABOLIC PANEL: CPT

## 2020-07-26 PROCEDURE — U0003 INFECTIOUS AGENT DETECTION BY NUCLEIC ACID (DNA OR RNA); SEVERE ACUTE RESPIRATORY SYNDROME CORONAVIRUS 2 (SARS-COV-2) (CORONAVIRUS DISEASE [COVID-19]), AMPLIFIED PROBE TECHNIQUE, MAKING USE OF HIGH THROUGHPUT TECHNOLOGIES AS DESCRIBED BY CMS-2020-01-R: HCPCS

## 2020-07-26 PROCEDURE — 36415 COLL VENOUS BLD VENIPUNCTURE: CPT

## 2020-07-26 PROCEDURE — 84703 CHORIONIC GONADOTROPIN ASSAY: CPT

## 2020-07-26 PROCEDURE — 85025 COMPLETE CBC W/AUTO DIFF WBC: CPT

## 2020-07-26 PROCEDURE — 87040 BLOOD CULTURE FOR BACTERIA: CPT

## 2020-07-26 PROCEDURE — 71045 X-RAY EXAM CHEST 1 VIEW: CPT

## 2020-07-26 PROCEDURE — 99284 EMERGENCY DEPT VISIT MOD MDM: CPT

## 2020-07-26 PROCEDURE — 83605 ASSAY OF LACTIC ACID: CPT

## 2020-07-26 RX ORDER — KETOROLAC TROMETHAMINE 30 MG/ML
30 INJECTION, SOLUTION INTRAMUSCULAR; INTRAVENOUS ONCE
Status: DISCONTINUED | OUTPATIENT
Start: 2020-07-26 | End: 2020-07-26

## 2020-07-26 RX ORDER — 0.9 % SODIUM CHLORIDE 0.9 %
1000 INTRAVENOUS SOLUTION INTRAVENOUS ONCE
Status: DISCONTINUED | OUTPATIENT
Start: 2020-07-26 | End: 2020-07-27

## 2020-07-26 RX ORDER — DIPHENHYDRAMINE HYDROCHLORIDE 50 MG/ML
25 INJECTION INTRAMUSCULAR; INTRAVENOUS ONCE
Status: DISCONTINUED | OUTPATIENT
Start: 2020-07-26 | End: 2020-07-26

## 2020-07-27 LAB — SARS-COV-2, PCR: DETECTED

## 2020-07-27 RX ORDER — NORGESTREL AND ETHINYL ESTRADIOL 0.3-0.03MG
1 KIT ORAL DAILY
COMMUNITY
Start: 2020-07-06 | End: 2022-06-17 | Stop reason: ALTCHOICE

## 2020-07-27 NOTE — ED NOTES
VO per EMD   Hold medicines.  Pt denies any pain at this time     Matilde Blancas, MOHAMUD  07/26/20 0276

## 2020-07-27 NOTE — ED PROVIDER NOTES
[facility] ENCOUNTER      Pt Name: Jenny Davis  MRN: [id number]  [place] 1995  Date of evaluation: 7/26/2020  Provider: Jaelyn Helton, Emilie9 [facility]  Chief Complaint   Patient presents with    Migraine     Headaches for 1 week - loss of smell and taste - [place] - did have diarrhea and body aches but not currently        I wore personal protective equipment when I was in the room the entire time. This includes gloves, N95 mask, face shield, and a glove over my stethoscope for protection. HPI  Jenny Davis is a 23 y.o. female who presents with viral infection she described as muscle aches. She is also been coughing and short of breath. She states that she has lost her taste and smell sentences. She states that is reason she is here. She states she also has a migraine headache. However, she is never been diagnosed with migraine headaches. She has no family history of migraine headaches. She describes her pain in the frontal area as sharp and throbbing. She denies any radiation or pain. Nothing makes it better or worse. She denies any problems with her vision or with photophobia. .  ? REVIEW OF SYSTEMS  All systems negative except as noted in the HPI. Reviewed Nurses' notes and concur. No LMP recorded. (Menstrual status: Irregular periods).     PAST MEDICAL HISTORY  Past Medical History:   Diagnosis Date    Bipolar affective ([address]     Cannabis abuse     Depression     SERA (generalized anxiety disorder)     Hypertension     Hypomanic personality disorder ([facility] [address]     MDRO (multiple drug resistant organisms) resistance 12/31/2018    Urine Culture    OCD (obsessive compulsive disorder)     PCOS (polycystic ovarian syndrome)     Suicidal ideation     Vitamin D deficiency        FAMILY HISTORY  Family History   Problem Relation Age of Onset    Cancer Mother     Cancer Father     Heart Disease Father components within normal limits    Narrative:     Performed at:                  [facility]                  [address] [place] [place], [facility]   Phone [phone]   COMPREHENSIVE METABOLIC PANEL - Abnormal; Notable for the following components:    Glucose 128 (*)     ALT 48 (*)     AST 65 (*)     All other components within normal limits    Narrative:     Performed at:                  [facility]                  [address] [place] [place], [place] [facility]   Phone [phone]   CULTURE, BLOOD 1   CULTURE, BLOOD 2   HCG, SERUM, QUALITATIVE    Narrative:     Performed at:                  [facility]                  [address] [place], [place] [facility]   Phone [phone]   LACTATE, SEPSIS    Narrative: Why timed? Performed at:                  [facility]                  [address] [place] [place], [place] [facility]   Phone [phone]   LACTATE, SEPSIS   COVID-19   COVID-19       ? RADIOLOGY/PROCEDURES  I personally reviewed the images for this case. XR CHEST PORTABLE   Final Result   Radiographically clear lungs. CT HEAD WO CONTRAST   Final Result   No acute intracranial abnormality. COURSE & MEDICAL DECISION MAKING  Pertinent Labs & Imaging studies reviewed. (See chart for details)    Vitals:    07/26/20 2207   BP: (!) 132/113   Pulse: 95   Resp: 24   Temp: 100.8 °F (38.2 °C)   TempSrc: Oral   SpO2: 96%   Weight: 244 lb 7.8 oz (110.9 kg)   Height: 5' (1.524 m)       Medications - No data to display    There are no discharge medications for this patient. SEP-1 CORE MEASURE DATA  Exclusion criteria: the patient is NOT to be included for sepsis due to:  SIRS criteria are not met    Patient remained stable in the ED. CT scan was negative.   Remainder of her work-up was

## 2020-07-31 LAB — BLOOD CULTURE, ROUTINE: NORMAL

## 2022-03-03 ENCOUNTER — HOSPITAL ENCOUNTER (EMERGENCY)
Age: 27
Discharge: HOME | End: 2022-03-03
Attending: EMERGENCY MEDICINE
Payer: COMMERCIAL

## 2022-03-03 VITALS
HEART RATE: 93 BPM | BODY MASS INDEX: 46.27 KG/M2 | RESPIRATION RATE: 18 BRPM | TEMPERATURE: 98.4 F | SYSTOLIC BLOOD PRESSURE: 132 MMHG | DIASTOLIC BLOOD PRESSURE: 89 MMHG | OXYGEN SATURATION: 97 % | HEIGHT: 60 IN | WEIGHT: 235.67 LBS

## 2022-03-03 DIAGNOSIS — H92.02 OTALGIA OF LEFT EAR: Primary | ICD-10-CM

## 2022-03-03 DIAGNOSIS — J06.9 ACUTE UPPER RESPIRATORY INFECTION: ICD-10-CM

## 2022-03-03 PROCEDURE — U0005 INFEC AGEN DETEC AMPLI PROBE: HCPCS

## 2022-03-03 PROCEDURE — U0003 INFECTIOUS AGENT DETECTION BY NUCLEIC ACID (DNA OR RNA); SEVERE ACUTE RESPIRATORY SYNDROME CORONAVIRUS 2 (SARS-COV-2) (CORONAVIRUS DISEASE [COVID-19]), AMPLIFIED PROBE TECHNIQUE, MAKING USE OF HIGH THROUGHPUT TECHNOLOGIES AS DESCRIBED BY CMS-2020-01-R: HCPCS

## 2022-03-03 PROCEDURE — 99283 EMERGENCY DEPT VISIT LOW MDM: CPT

## 2022-03-03 RX ORDER — BENZONATATE 100 MG/1
100-200 CAPSULE ORAL 3 TIMES DAILY PRN
Qty: 60 CAPSULE | Refills: 0 | Status: SHIPPED | OUTPATIENT
Start: 2022-03-03 | End: 2022-03-10

## 2022-03-03 RX ORDER — PREDNISONE 20 MG/1
20 TABLET ORAL DAILY
Qty: 5 TABLET | Refills: 0 | Status: SHIPPED | OUTPATIENT
Start: 2022-03-03 | End: 2022-03-08

## 2022-03-03 RX ORDER — ALBUTEROL SULFATE 90 UG/1
2 AEROSOL, METERED RESPIRATORY (INHALATION) 4 TIMES DAILY PRN
Qty: 54 G | Refills: 1 | Status: SHIPPED | OUTPATIENT
Start: 2022-03-03 | End: 2022-11-04

## 2022-03-03 RX ORDER — AMOXICILLIN 500 MG/1
500 CAPSULE ORAL 3 TIMES DAILY
Qty: 30 CAPSULE | Refills: 0 | Status: SHIPPED | OUTPATIENT
Start: 2022-03-03 | End: 2022-03-13

## 2022-03-03 RX ORDER — BROMPHENIRAMINE MALEATE, PSEUDOEPHEDRINE HYDROCHLORIDE, AND DEXTROMETHORPHAN HYDROBROMIDE 2; 30; 10 MG/5ML; MG/5ML; MG/5ML
SYRUP ORAL
COMMUNITY
Start: 2022-03-01 | End: 2022-06-17 | Stop reason: ALTCHOICE

## 2022-03-03 NOTE — ED TRIAGE NOTES
Patient present to ED complaining of cough, congestion and left ear pain. Patient denies fever at home. Patient reports dry throat. States she has been seen a urgent care on the 26th and was prescribed cough medications without relief. MD at bedside on arrival.      Patient resting on bed, respirations even and easy at this time. No obvious distress.

## 2022-03-03 NOTE — Clinical Note
Dick Jeong was seen and treated in our emergency department on 3/3/2022. She may return to work on 03/07/2022. If you have any questions or concerns, please don't hesitate to call.       Octavia Joshi MD

## 2022-03-03 NOTE — Clinical Note
Karlene Sotelo was seen and treated in our emergency department on 3/3/2022. She may return to work on 03/07/2022. If you have any questions or concerns, please don't hesitate to call.       Mary Jane Trevino MD

## 2022-03-03 NOTE — ED NOTES
Patient ambulatory from ED. AVS provided and discussed with patient. All questions answered. Patient verbalizes understanding of discharge instructions. Respirations even and easy. No obvious distress at this time. Patient notified that they should not drive while taking hydocan due to potential for drowsiness and its status as a controlled substance. Patient verbalizes understanding. Patient advised to take full course of antibiotics as prescribed, even if symptoms begin to subside. Patient verbalizes understanding.        [facility]  03/03/22 6269

## 2022-03-04 LAB — SARS-COV-2: NOT DETECTED

## 2022-06-17 ENCOUNTER — HOSPITAL ENCOUNTER (EMERGENCY)
Age: 27
Discharge: HOME | End: 2022-06-17
Attending: EMERGENCY MEDICINE
Payer: COMMERCIAL

## 2022-06-17 VITALS
DIASTOLIC BLOOD PRESSURE: 85 MMHG | BODY MASS INDEX: 46.36 KG/M2 | OXYGEN SATURATION: 98 % | HEART RATE: 89 BPM | SYSTOLIC BLOOD PRESSURE: 136 MMHG | RESPIRATION RATE: 16 BRPM | TEMPERATURE: 98.2 F | WEIGHT: 236.11 LBS | HEIGHT: 60 IN

## 2022-06-17 DIAGNOSIS — M54.31 RIGHT SIDED SCIATICA: Primary | ICD-10-CM

## 2022-06-17 PROCEDURE — 99283 EMERGENCY DEPT VISIT LOW MDM: CPT

## 2022-06-17 PROCEDURE — 6370000000 HC RX 637 (ALT 250 FOR IP): Performed by: EMERGENCY MEDICINE

## 2022-06-17 RX ORDER — METHYLPREDNISOLONE 4 MG/1
TABLET ORAL
Qty: 1 KIT | Refills: 0 | Status: SHIPPED | OUTPATIENT
Start: 2022-06-17 | End: 2022-11-04

## 2022-06-17 RX ORDER — NAPROXEN 375 MG/1
375 TABLET ORAL EVERY 12 HOURS PRN
Qty: 30 TABLET | Refills: 0 | Status: SHIPPED | OUTPATIENT
Start: 2022-06-17 | End: 2022-11-04

## 2022-06-17 RX ORDER — LIDOCAINE 50 MG/G
1 PATCH TOPICAL DAILY
Qty: 10 PATCH | Refills: 0 | Status: SHIPPED | OUTPATIENT
Start: 2022-06-17 | End: 2022-06-27

## 2022-06-17 RX ORDER — NORETHINDRONE AND ETHINYL ESTRADIOL 1; .035 MG/1; MG/1
1 TABLET ORAL DAILY
COMMUNITY

## 2022-06-17 RX ORDER — NAPROXEN 375 MG/1
375 TABLET ORAL EVERY 12 HOURS PRN
Qty: 30 TABLET | Refills: 0 | Status: SHIPPED | OUTPATIENT
Start: 2022-06-17 | End: 2022-06-17 | Stop reason: SDUPTHER

## 2022-06-17 RX ORDER — LIDOCAINE 50 MG/G
1 PATCH TOPICAL DAILY
Qty: 10 PATCH | Refills: 0 | Status: SHIPPED | OUTPATIENT
Start: 2022-06-17 | End: 2022-06-17 | Stop reason: SDUPTHER

## 2022-06-17 RX ORDER — NAPROXEN 250 MG/1
500 TABLET ORAL ONCE
Status: COMPLETED | OUTPATIENT
Start: 2022-06-17 | End: 2022-06-17

## 2022-06-17 RX ORDER — PREDNISONE 20 MG/1
60 TABLET ORAL ONCE
Status: COMPLETED | OUTPATIENT
Start: 2022-06-17 | End: 2022-06-17

## 2022-06-17 RX ORDER — METHYLPREDNISOLONE 4 MG/1
TABLET ORAL
Qty: 1 KIT | Refills: 0 | Status: SHIPPED | OUTPATIENT
Start: 2022-06-17 | End: 2022-06-17 | Stop reason: SDUPTHER

## 2022-06-17 RX ADMIN — NAPROXEN 500 MG: 250 TABLET ORAL at 21:42

## 2022-06-17 RX ADMIN — PREDNISONE 60 MG: 20 TABLET ORAL at 21:42

## 2022-06-17 ASSESSMENT — PAIN SCALES - GENERAL
PAINLEVEL_OUTOF10: 7
PAINLEVEL_OUTOF10: 7

## 2022-06-17 ASSESSMENT — PAIN DESCRIPTION - LOCATION: LOCATION: BUTTOCKS;LEG

## 2022-06-17 ASSESSMENT — PAIN DESCRIPTION - DESCRIPTORS: DESCRIPTORS: SHARP

## 2022-06-17 ASSESSMENT — PAIN DESCRIPTION - PAIN TYPE: TYPE: ACUTE PAIN

## 2022-06-17 ASSESSMENT — PAIN DESCRIPTION - ORIENTATION: ORIENTATION: RIGHT

## 2022-06-18 NOTE — ED PROVIDER NOTES
CHIEF COMPLAINT  Buttocks Pain (pain right buttocks radiating down right leg to ankle. pain at 7.  no OTC pain meds. pain started at 1500 today. no known injury but states does a lot of heavy lifting.) and Leg Pain      HISTORY OF PRESENT ILLNESS  Ryann Mike is a 24 y.o. female who  has a past medical history of Bipolar affective ([facility] [address] Cannabis abuse, Depression, SERA (generalized anxiety disorder), Hypertension, Hypomanic personality disorder ([address] MDRO (multiple drug resistant organisms) resistance, OCD (obsessive compulsive disorder), PCOS (polycystic ovarian syndrome), Suicidal ideation, and Vitamin D deficiency. presents to the ED complaining of right buttock pain that radiates down the back of her right leg towards her ankle. Patient states pain is 7 out of 10. States he gets worse when she is sitting. States the pain started at 1500 today. Denies any falls or trauma. No numbness or weakness. States she does a lot of heavy lifting at work. No fevers or chills. Denies any associated back pain. Normal urinary habits. Denies any associate abdominal pain. Denies taking any over-the-counter medications prior to coming to the emergency room. No bowel or bladder incontinence. No other complaints, modifying factors or associated symptoms. Nursing notes reviewed.    Past Medical History:   Diagnosis Date    Bipolar affective ([facility] [address]     Cannabis abuse     Depression     SERA (generalized anxiety disorder)     Hypertension     Hypomanic personality disorder ([facility] [address]     MDRO (multiple drug resistant organisms) resistance 12/31/2018    Urine Culture    OCD (obsessive compulsive disorder)     PCOS (polycystic ovarian syndrome)     Suicidal ideation     Vitamin D deficiency      Past Surgical History:   Procedure Laterality Date    FIBULA FRACTURE SURGERY Right 4/29/2019    OPEN REDUCTION INTERNAL FIXATION RIGHT FIBULAR SHAFT WITH C-ARM performed by Jin De La Cruz MD at [facility] History   Problem Relation Age of Onset    Cancer Mother     Cancer Father     Heart Disease Father      Social History     Socioeconomic History    Marital status: Single     Spouse name: Not on file    Number of children: Not on file    Years of education: Not on file    Highest education level: Not on file   Occupational History    Not on file   Tobacco Use    Smoking status: Never Smoker    Smokeless tobacco: Never Used   Vaping Use    Vaping Use: Never used   Substance and Sexual Activity    Alcohol use: Yes     Comment: once a month    Drug use: Not Currently     Types: Marijuana [employer])     Comment: 6/17/22 last marijuana 3 yrs ago     Sexual activity: Yes     Partners: Male   Other Topics Concern    Not on file   Social History Narrative    Not on file     Social Determinants of Health     Financial Resource Strain:     Difficulty of Paying Living Expenses: Not on file   Food Insecurity:     Worried About [address] in the Last Year: Not on file    Tree of Food in the Last Year: Not on file   Transportation Needs:     Lack of Transportation (Medical): Not on file    Lack of Transportation (Non-Medical):  Not on file   Physical Activity:     Days of Exercise per Week: Not on file    Minutes of Exercise per Session: Not on file   Stress:     Feeling of Stress : Not on file   Social Connections:     Frequency of Communication with Friends and Family: Not on file    Frequency of Social Gatherings with Friends and Family: Not on file    Attends Jainism Services: Not on file    Active Member of Clubs or Organizations: Not on file    Attends Club or Organization Meetings: Not on file    Marital Status: Not on file   Intimate Partner Violence:     Fear of Current or Ex-Partner: Not on file    Emotionally Abused: Not on file    Physically Abused: Not on file    Sexually Abused: Not on file   Housing Stability:     Unable to Pay for Housing in the Last Year: Not on file patella tendon reflexes 2+   MUSCULOSKELETAL: No clubbing, cyanosis or edema, positive straight leg raise on the right, negative straight leg raise on the left, DP pulse 2+ bilaterally   SKIN: No rash, pallor or wounds on exposed surfaces         RADIOLOGY  X-RAYS:  I have reviewed radiologic plain film image(s). ALL OTHER NON-PLAIN FILM IMAGES SUCH AS CT, ULTRASOUND AND MRI HAVE BEEN READ BY THE RADIOLOGIST. No orders to display          EKG INTERPRETATION  None    PROCEDURES    ED COURSE/MDM  Sciatica, muscle strain, muscle spasm  Patient seen and evaluated. History and physical as above. Nontoxic, afebrile. Patient presents complaining of right buttock pain that radiates down the back of her right leg consistent with sciatica. Patient is neurologically intact. Normal distal perfusion. No acute findings on high-sensitivity neuro exam to suggest cauda equina, epidural abscess, epidural hematoma or cord compression. Patient ambulatory with a steady gait. States he is having normal urinary habits. No falls or trauma suggest need for imaging. No abdominal tenderness. Plan for naproxen and prednisone here in the emergency room. Will discharge with naproxen, Medrol Dosepak and Lidoderm patches. Stressed the importance of stretching as well as minimizing heavy lifting. Patient agreeable. Patient provided work note for [employer]. Return instruction provided. Questions answered prior to discharge. I estimate there is LOW risk for ABDOMINAL AORTIC ANEURYSM, CAUDA EQUINA SYNDROME, EPIDURAL MASS LESION, SPINAL STENOSIS, OR HERNIATED DISK CAUSING SEVERE STENOSIS, thus I consider the discharge disposition reasonable. Purnima Herzog and I have discussed the diagnosis and risks, and we agree with discharging home to follow-up with their primary doctor. We also discussed returning to the Emergency Department immediately if new or worsening symptoms occur.  We have discussed the symptoms which are most

## 2022-06-18 NOTE — ED NOTES
pain right buttocks radiating down right leg to ankle. pain at 7.  no OTC pain meds. pain started at 1500 today. no known injury but states does a lot of heavy lifting.      Sumeet Alexander RN  06/17/22 2732

## 2022-06-18 NOTE — ED NOTES
Discharge instructions with pt. Back pain/sciatica teaching done. Explained rx's. Right buttocks pain radiating down right leg at 7. Encouraged follow up with PCP. Home ambulatory. Gait steady.      Mario Zhao RN  06/17/22 2765

## 2022-11-04 ENCOUNTER — APPOINTMENT (OUTPATIENT)
Dept: CT IMAGING | Age: 27
End: 2022-11-04
Payer: COMMERCIAL

## 2022-11-04 ENCOUNTER — HOSPITAL ENCOUNTER (EMERGENCY)
Age: 27
Discharge: HOME | End: 2022-11-05
Attending: EMERGENCY MEDICINE
Payer: COMMERCIAL

## 2022-11-04 ENCOUNTER — HOSPITAL ENCOUNTER (EMERGENCY)
Age: 27
Discharge: HOME | End: 2022-11-04
Attending: EMERGENCY MEDICINE
Payer: COMMERCIAL

## 2022-11-04 ENCOUNTER — APPOINTMENT (OUTPATIENT)
Dept: GENERAL RADIOLOGY | Age: 27
End: 2022-11-04
Payer: COMMERCIAL

## 2022-11-04 VITALS
BODY MASS INDEX: 46.01 KG/M2 | DIASTOLIC BLOOD PRESSURE: 80 MMHG | OXYGEN SATURATION: 94 % | WEIGHT: 234.35 LBS | RESPIRATION RATE: 14 BRPM | HEART RATE: 115 BPM | HEIGHT: 60 IN | SYSTOLIC BLOOD PRESSURE: 154 MMHG | TEMPERATURE: 99.8 F

## 2022-11-04 VITALS
RESPIRATION RATE: 17 BRPM | HEART RATE: 116 BPM | OXYGEN SATURATION: 99 % | HEIGHT: 60 IN | WEIGHT: 232.37 LBS | BODY MASS INDEX: 45.62 KG/M2 | DIASTOLIC BLOOD PRESSURE: 94 MMHG | TEMPERATURE: 98.5 F | SYSTOLIC BLOOD PRESSURE: 124 MMHG

## 2022-11-04 DIAGNOSIS — E83.42 HYPOMAGNESEMIA: ICD-10-CM

## 2022-11-04 DIAGNOSIS — I47.1 PAROXYSMAL SUPRAVENTRICULAR TACHYCARDIA (HCC): ICD-10-CM

## 2022-11-04 DIAGNOSIS — E87.6 HYPOKALEMIA: ICD-10-CM

## 2022-11-04 DIAGNOSIS — Z20.822 LAB TEST NEGATIVE FOR COVID-19 VIRUS: ICD-10-CM

## 2022-11-04 DIAGNOSIS — R00.0 SINUS TACHYCARDIA: ICD-10-CM

## 2022-11-04 DIAGNOSIS — R00.0 TACHYCARDIA: Primary | ICD-10-CM

## 2022-11-04 DIAGNOSIS — J06.9 VIRAL URI WITH COUGH: Primary | ICD-10-CM

## 2022-11-04 DIAGNOSIS — F41.1 ANXIETY STATE: ICD-10-CM

## 2022-11-04 LAB
A/G RATIO: 1.3 (ref 1.1–2.2)
ALBUMIN SERPL-MCNC: 4.2 G/DL (ref 3.4–5)
ALP BLD-CCNC: 56 U/L (ref 40–129)
ALT SERPL-CCNC: 13 U/L (ref 10–40)
AMPHETAMINE SCREEN, URINE: NORMAL
ANION GAP SERPL CALCULATED.3IONS-SCNC: 11 MMOL/L (ref 3–16)
ANION GAP SERPL CALCULATED.3IONS-SCNC: 15 MMOL/L (ref 3–16)
AST SERPL-CCNC: 18 U/L (ref 15–37)
BARBITURATE SCREEN URINE: NORMAL
BASOPHILS ABSOLUTE: 0 K/UL (ref 0–0.2)
BASOPHILS ABSOLUTE: 0 K/UL (ref 0–0.2)
BASOPHILS RELATIVE PERCENT: 0.4 %
BASOPHILS RELATIVE PERCENT: 0.6 %
BENZODIAZEPINE SCREEN, URINE: NORMAL
BILIRUB SERPL-MCNC: <0.2 MG/DL (ref 0–1)
BILIRUBIN URINE: NEGATIVE
BLOOD, URINE: NEGATIVE
BUN BLDV-MCNC: 13 MG/DL (ref 7–20)
BUN BLDV-MCNC: 8 MG/DL (ref 7–20)
CALCIUM SERPL-MCNC: 8.9 MG/DL (ref 8.3–10.6)
CALCIUM SERPL-MCNC: 9.6 MG/DL (ref 8.3–10.6)
CANNABINOID SCREEN URINE: NORMAL
CHLORIDE BLD-SCNC: 101 MMOL/L (ref 99–110)
CHLORIDE BLD-SCNC: 103 MMOL/L (ref 99–110)
CLARITY: CLEAR
CO2: 19 MMOL/L (ref 21–32)
CO2: 21 MMOL/L (ref 21–32)
COCAINE METABOLITE SCREEN URINE: NORMAL
COLOR: YELLOW
CREAT SERPL-MCNC: <0.5 MG/DL (ref 0.6–1.1)
CREAT SERPL-MCNC: <0.5 MG/DL (ref 0.6–1.1)
D DIMER: 0.56 UG/ML FEU (ref 0–0.6)
EOSINOPHILS ABSOLUTE: 0 K/UL (ref 0–0.6)
EOSINOPHILS ABSOLUTE: 0 K/UL (ref 0–0.6)
EOSINOPHILS RELATIVE PERCENT: 0.2 %
EOSINOPHILS RELATIVE PERCENT: 0.4 %
FENTANYL SCREEN, URINE: NORMAL
GFR SERPL CREATININE-BSD FRML MDRD: >60 ML/MIN/{1.73_M2}
GFR SERPL CREATININE-BSD FRML MDRD: >60 ML/MIN/{1.73_M2}
GLUCOSE BLD-MCNC: 148 MG/DL (ref 70–99)
GLUCOSE BLD-MCNC: 149 MG/DL (ref 70–99)
GLUCOSE URINE: NEGATIVE MG/DL
HCG(URINE) PREGNANCY TEST: NEGATIVE
HCT VFR BLD CALC: 38 % (ref 36–48)
HCT VFR BLD CALC: 38.9 % (ref 36–48)
HEMOGLOBIN: 12.6 G/DL (ref 12–16)
HEMOGLOBIN: 12.9 G/DL (ref 12–16)
KETONES, URINE: NEGATIVE MG/DL
LEUKOCYTE ESTERASE, URINE: NEGATIVE
LYMPHOCYTES ABSOLUTE: 0.8 K/UL (ref 1–5.1)
LYMPHOCYTES ABSOLUTE: 0.8 K/UL (ref 1–5.1)
LYMPHOCYTES RELATIVE PERCENT: 10 %
LYMPHOCYTES RELATIVE PERCENT: 12.4 %
Lab: NORMAL
MAGNESIUM: 1.6 MG/DL (ref 1.8–2.4)
MCH RBC QN AUTO: 26.7 PG (ref 26–34)
MCH RBC QN AUTO: 27.4 PG (ref 26–34)
MCHC RBC AUTO-ENTMCNC: 32.3 G/DL (ref 31–36)
MCHC RBC AUTO-ENTMCNC: 33.9 G/DL (ref 31–36)
MCV RBC AUTO: 81 FL (ref 80–100)
MCV RBC AUTO: 82.7 FL (ref 80–100)
METHADONE SCREEN, URINE: NORMAL
MICROSCOPIC EXAMINATION: NORMAL
MONOCYTES ABSOLUTE: 0.5 K/UL (ref 0–1.3)
MONOCYTES ABSOLUTE: 0.6 K/UL (ref 0–1.3)
MONOCYTES RELATIVE PERCENT: 5.7 %
MONOCYTES RELATIVE PERCENT: 9.6 %
NEUTROPHILS ABSOLUTE: 4.8 K/UL (ref 1.7–7.7)
NEUTROPHILS ABSOLUTE: 6.7 K/UL (ref 1.7–7.7)
NEUTROPHILS RELATIVE PERCENT: 77.2 %
NEUTROPHILS RELATIVE PERCENT: 83.5 %
NITRITE, URINE: NEGATIVE
OPIATE SCREEN URINE: NORMAL
OXYCODONE URINE: NORMAL
PDW BLD-RTO: 14.8 % (ref 12.4–15.4)
PDW BLD-RTO: 14.8 % (ref 12.4–15.4)
PH UA: 6
PH UA: 6 (ref 5–8)
PHENCYCLIDINE SCREEN URINE: NORMAL
PLATELET # BLD: 279 K/UL (ref 135–450)
PLATELET # BLD: 298 K/UL (ref 135–450)
PMV BLD AUTO: 7.3 FL (ref 5–10.5)
PMV BLD AUTO: 7.4 FL (ref 5–10.5)
POTASSIUM REFLEX MAGNESIUM: 3.2 MMOL/L (ref 3.5–5.1)
POTASSIUM REFLEX MAGNESIUM: 3.6 MMOL/L (ref 3.5–5.1)
PROTEIN UA: NEGATIVE MG/DL
RAPID INFLUENZA  B AGN: NEGATIVE
RAPID INFLUENZA A AGN: NEGATIVE
RBC # BLD: 4.69 M/UL (ref 4–5.2)
RBC # BLD: 4.71 M/UL (ref 4–5.2)
SARS-COV-2, NAAT: NOT DETECTED
SODIUM BLD-SCNC: 135 MMOL/L (ref 136–145)
SODIUM BLD-SCNC: 135 MMOL/L (ref 136–145)
SPECIFIC GRAVITY UA: 1.02 (ref 1–1.03)
T4 FREE: 1.3 NG/DL (ref 0.9–1.8)
TOTAL PROTEIN: 7.5 G/DL (ref 6.4–8.2)
TROPONIN: <0.01 NG/ML
TROPONIN: <0.01 NG/ML
TSH SERPL DL<=0.05 MIU/L-ACNC: 2.89 UIU/ML (ref 0.27–4.2)
URINE REFLEX TO CULTURE: NORMAL
URINE TYPE: NORMAL
UROBILINOGEN, URINE: 0.2 E.U./DL
WBC # BLD: 6.2 K/UL (ref 4–11)
WBC # BLD: 8 K/UL (ref 4–11)

## 2022-11-04 PROCEDURE — 93005 ELECTROCARDIOGRAM TRACING: CPT | Performed by: EMERGENCY MEDICINE

## 2022-11-04 PROCEDURE — 2500000003 HC RX 250 WO HCPCS: Performed by: EMERGENCY MEDICINE

## 2022-11-04 PROCEDURE — 83735 ASSAY OF MAGNESIUM: CPT

## 2022-11-04 PROCEDURE — 84484 ASSAY OF TROPONIN QUANT: CPT

## 2022-11-04 PROCEDURE — 84703 CHORIONIC GONADOTROPIN ASSAY: CPT

## 2022-11-04 PROCEDURE — 6370000000 HC RX 637 (ALT 250 FOR IP): Performed by: EMERGENCY MEDICINE

## 2022-11-04 PROCEDURE — 96375 TX/PRO/DX INJ NEW DRUG ADDON: CPT

## 2022-11-04 PROCEDURE — 2580000003 HC RX 258: Performed by: EMERGENCY MEDICINE

## 2022-11-04 PROCEDURE — 71045 X-RAY EXAM CHEST 1 VIEW: CPT

## 2022-11-04 PROCEDURE — 84443 ASSAY THYROID STIM HORMONE: CPT

## 2022-11-04 PROCEDURE — 96361 HYDRATE IV INFUSION ADD-ON: CPT

## 2022-11-04 PROCEDURE — 71260 CT THORAX DX C+: CPT | Performed by: EMERGENCY MEDICINE

## 2022-11-04 PROCEDURE — 96376 TX/PRO/DX INJ SAME DRUG ADON: CPT

## 2022-11-04 PROCEDURE — 81003 URINALYSIS AUTO W/O SCOPE: CPT

## 2022-11-04 PROCEDURE — 85379 FIBRIN DEGRADATION QUANT: CPT

## 2022-11-04 PROCEDURE — 85025 COMPLETE CBC W/AUTO DIFF WBC: CPT

## 2022-11-04 PROCEDURE — 80307 DRUG TEST PRSMV CHEM ANLYZR: CPT

## 2022-11-04 PROCEDURE — 6360000004 HC RX CONTRAST MEDICATION: Performed by: EMERGENCY MEDICINE

## 2022-11-04 PROCEDURE — 80053 COMPREHEN METABOLIC PANEL: CPT

## 2022-11-04 PROCEDURE — 96374 THER/PROPH/DIAG INJ IV PUSH: CPT

## 2022-11-04 PROCEDURE — 87635 SARS-COV-2 COVID-19 AMP PRB: CPT

## 2022-11-04 PROCEDURE — 6360000002 HC RX W HCPCS: Performed by: EMERGENCY MEDICINE

## 2022-11-04 PROCEDURE — 99285 EMERGENCY DEPT VISIT HI MDM: CPT

## 2022-11-04 PROCEDURE — 84439 ASSAY OF FREE THYROXINE: CPT

## 2022-11-04 PROCEDURE — 87804 INFLUENZA ASSAY W/OPTIC: CPT

## 2022-11-04 PROCEDURE — 36415 COLL VENOUS BLD VENIPUNCTURE: CPT

## 2022-11-04 RX ORDER — FLUOXETINE HYDROCHLORIDE 20 MG/1
20 CAPSULE ORAL DAILY
Qty: 30 CAPSULE | Refills: 2 | Status: SHIPPED | OUTPATIENT
Start: 2022-11-04 | End: 2022-11-16

## 2022-11-04 RX ORDER — MAGNESIUM SULFATE 1 G/100ML
1000 INJECTION INTRAVENOUS ONCE
Status: COMPLETED | OUTPATIENT
Start: 2022-11-04 | End: 2022-11-05

## 2022-11-04 RX ORDER — LANOLIN ALCOHOL/MO/W.PET/CERES
400 CREAM (GRAM) TOPICAL ONCE
Status: COMPLETED | OUTPATIENT
Start: 2022-11-04 | End: 2022-11-04

## 2022-11-04 RX ORDER — POTASSIUM CHLORIDE 750 MG/1
10 TABLET, FILM COATED, EXTENDED RELEASE ORAL ONCE
Status: COMPLETED | OUTPATIENT
Start: 2022-11-04 | End: 2022-11-04

## 2022-11-04 RX ORDER — LORAZEPAM 2 MG/ML
1 INJECTION INTRAMUSCULAR ONCE
Status: COMPLETED | OUTPATIENT
Start: 2022-11-04 | End: 2022-11-04

## 2022-11-04 RX ORDER — HYDROXYZINE PAMOATE 25 MG/1
25 CAPSULE ORAL EVERY 8 HOURS PRN
Qty: 10 CAPSULE | Refills: 0 | Status: SHIPPED | OUTPATIENT
Start: 2022-11-04 | End: 2022-11-14

## 2022-11-04 RX ORDER — MAGNESIUM OXIDE 400 MG/1
400 TABLET ORAL DAILY
Qty: 5 TABLET | Refills: 0 | Status: SHIPPED | OUTPATIENT
Start: 2022-11-04 | End: 2022-11-09

## 2022-11-04 RX ORDER — METHYLPREDNISOLONE 4 MG/1
4 TABLET ORAL SEE ADMIN INSTRUCTIONS
Qty: 1 KIT | Refills: 0 | Status: SHIPPED
Start: 2022-11-04 | End: 2022-11-04 | Stop reason: ALTCHOICE

## 2022-11-04 RX ORDER — METOPROLOL SUCCINATE 25 MG/1
25 TABLET, EXTENDED RELEASE ORAL DAILY
Qty: 30 TABLET | Refills: 0 | Status: SHIPPED | OUTPATIENT
Start: 2022-11-04 | End: 2022-12-04

## 2022-11-04 RX ORDER — ADENOSINE 3 MG/ML
INJECTION, SOLUTION INTRAVENOUS
Status: DISCONTINUED
Start: 2022-11-04 | End: 2022-11-04 | Stop reason: HOSPADM

## 2022-11-04 RX ORDER — 0.9 % SODIUM CHLORIDE 0.9 %
500 INTRAVENOUS SOLUTION INTRAVENOUS ONCE
Status: COMPLETED | OUTPATIENT
Start: 2022-11-04 | End: 2022-11-05

## 2022-11-04 RX ORDER — POTASSIUM CHLORIDE 750 MG/1
40 TABLET, FILM COATED, EXTENDED RELEASE ORAL ONCE
Status: COMPLETED | OUTPATIENT
Start: 2022-11-04 | End: 2022-11-04

## 2022-11-04 RX ORDER — BENZONATATE 200 MG/1
200 CAPSULE ORAL 3 TIMES DAILY PRN
Qty: 30 CAPSULE | Refills: 0 | Status: SHIPPED | OUTPATIENT
Start: 2022-11-04 | End: 2022-11-11

## 2022-11-04 RX ORDER — METOPROLOL TARTRATE 5 MG/5ML
5 INJECTION INTRAVENOUS EVERY 5 MIN PRN
Status: COMPLETED | OUTPATIENT
Start: 2022-11-04 | End: 2022-11-04

## 2022-11-04 RX ORDER — ONDANSETRON 2 MG/ML
4 INJECTION INTRAMUSCULAR; INTRAVENOUS ONCE
Status: COMPLETED | OUTPATIENT
Start: 2022-11-04 | End: 2022-11-04

## 2022-11-04 RX ORDER — POTASSIUM CHLORIDE 750 MG/1
20 TABLET, EXTENDED RELEASE ORAL DAILY
Qty: 10 TABLET | Refills: 0 | Status: SHIPPED | OUTPATIENT
Start: 2022-11-04 | End: 2022-11-16

## 2022-11-04 RX ORDER — ADENOSINE 3 MG/ML
12 INJECTION, SOLUTION INTRAVENOUS ONCE
Status: DISCONTINUED | OUTPATIENT
Start: 2022-11-04 | End: 2022-11-04 | Stop reason: HOSPADM

## 2022-11-04 RX ORDER — 0.9 % SODIUM CHLORIDE 0.9 %
1000 INTRAVENOUS SOLUTION INTRAVENOUS ONCE
Status: COMPLETED | OUTPATIENT
Start: 2022-11-04 | End: 2022-11-04

## 2022-11-04 RX ADMIN — MAGNESIUM SULFATE HEPTAHYDRATE 1000 MG: 1 INJECTION, SOLUTION INTRAVENOUS at 23:00

## 2022-11-04 RX ADMIN — POTASSIUM CHLORIDE 30 MEQ: 750 TABLET, FILM COATED, EXTENDED RELEASE ORAL at 22:56

## 2022-11-04 RX ADMIN — Medication 400 MG: at 22:56

## 2022-11-04 RX ADMIN — LORAZEPAM 1 MG: 2 INJECTION, SOLUTION INTRAMUSCULAR; INTRAVENOUS at 10:52

## 2022-11-04 RX ADMIN — POTASSIUM CHLORIDE 10 MEQ: 750 TABLET, FILM COATED, EXTENDED RELEASE ORAL at 23:07

## 2022-11-04 RX ADMIN — SODIUM CHLORIDE 500 ML: 9 INJECTION, SOLUTION INTRAVENOUS at 22:59

## 2022-11-04 RX ADMIN — METOPROLOL TARTRATE 5 MG: 5 INJECTION INTRAVENOUS at 13:09

## 2022-11-04 RX ADMIN — LORAZEPAM 1 MG: 2 INJECTION INTRAMUSCULAR; INTRAVENOUS at 21:23

## 2022-11-04 RX ADMIN — ONDANSETRON 4 MG: 2 INJECTION INTRAMUSCULAR; INTRAVENOUS at 21:23

## 2022-11-04 RX ADMIN — IOPAMIDOL 75 ML: 755 INJECTION, SOLUTION INTRAVENOUS at 22:09

## 2022-11-04 RX ADMIN — METOPROLOL TARTRATE 5 MG: 5 INJECTION INTRAVENOUS at 12:17

## 2022-11-04 RX ADMIN — SODIUM CHLORIDE 1000 ML: 9 INJECTION, SOLUTION INTRAVENOUS at 11:17

## 2022-11-04 RX ADMIN — METOPROLOL TARTRATE 5 MG: 5 INJECTION INTRAVENOUS at 13:46

## 2022-11-04 ASSESSMENT — PAIN - FUNCTIONAL ASSESSMENT
PAIN_FUNCTIONAL_ASSESSMENT: NONE - DENIES PAIN
PAIN_FUNCTIONAL_ASSESSMENT: 0-10

## 2022-11-04 ASSESSMENT — PAIN DESCRIPTION - DESCRIPTORS: DESCRIPTORS: ACHING;BURNING

## 2022-11-04 ASSESSMENT — PAIN SCALES - GENERAL: PAINLEVEL_OUTOF10: 4

## 2022-11-04 ASSESSMENT — PAIN DESCRIPTION - LOCATION: LOCATION: CHEST

## 2022-11-04 NOTE — Clinical Note
Torito Tamayo was seen and treated in our emergency department on 11/4/2022. She may return to work on 11/07/2022. No restrictions     If you have any questions or concerns, please don't hesitate to call.       Jonathan Senior, DO

## 2022-11-04 NOTE — ED NOTES
Pt drinking water, and having a snack, Dr. Paulina Cunningham aware      Theron Christina, RN  11/04/22 3685

## 2022-11-04 NOTE — ED NOTES
Patient requesting to have Nadya RN return as primary nurse due to anxiety. Nadya RN agreeable. Report given to [facility] RN at this time, all questions answered. Denies any further questions at this time.        Real Garcia RN  11/04/22 7122

## 2022-11-04 NOTE — ED TRIAGE NOTES
Patient presents to ED complaining of Patient reports productive cough which started yesterday. Patient reports chest pains with coughing and respiration. Reports some shortness of breath. Denies other symptoms. Tachycardic on arrival. Patient reports hx of anxiety and states she does feel anxious at this time. Reports eating and drinking appropriately at home. Patient resting on bed, respirations even and easy at this time. Patient appears anxious.

## 2022-11-04 NOTE — ED NOTES
Pt d/c home with AVS no s.s of distress noted script x 3 sent to pharmacy , she denies questions mom at [address], MOHAMUD  11/04/22 5587

## 2022-11-04 NOTE — ED PROVIDER NOTES
[facility] ENCOUNTER      Pt Name: Gage Ramirez  MRN: [id number]  [place] 1995  Date of evaluation: 11/4/2022  Provider: Kartik Queen, [facility]  Chief Complaint   Patient presents with    Cough     Patient reports productive cough which started yesterday. Patient reports chest pains with coughing and respiration. Reports some shortness of breath. Denies other symptoms. Tachycardic on arrival. Patient reports hx of anxiety and states she does feel anxious at this time. Reports eating wand drinking appropriately at home. This patient is at risk for COVID-19 viral infection. Therefore, personal protection equipment consisting of a mask and gloves was worn for the exam.    HPI  Gage Ramirez is a 24 y.o. female who presents with fast heart rate. She has had a productive cough which started yesterday. She been coughing and having some shortness of breath. Today she had fast heartbeat on arrival.  She has a history of anxiety. She feels anxious at the present time. She is having some chest tightness that started just prior to arrival.  Closed areas make it worse and open areas make it better. She denies any previous history. She denies a history of irregular heartbeat. REVIEW OF SYSTEMS  All systems negative except as noted in the HPI. Reviewed Nurses' notes and concur. Patient's last menstrual period was 10/21/2022.     PAST MEDICAL HISTORY  Past Medical History:   Diagnosis Date    Bipolar affective ([address]     Cannabis abuse     Depression     SERA (generalized anxiety disorder)     Hypertension     Hypomanic personality disorder ([facility] [address]     MDRO (multiple drug resistant organisms) resistance 12/31/2018    Urine Culture    OCD (obsessive compulsive disorder)     PCOS (polycystic ovarian syndrome)     Suicidal ideation     Vitamin D deficiency        FAMILY HISTORY  Family History   Problem Relation Age of Onset    Cancer Mother     Cancer Father     Heart Disease Father        SOCIAL HISTORY   reports that she has never smoked. She has never used smokeless tobacco. She reports current alcohol use. She reports that she does not currently use drugs after having used the following drugs: Marijuana Serge Radon). SURGICAL HISTORY  Past Surgical History:   Procedure Laterality Date    FIBULA FRACTURE SURGERY Right 4/29/2019    OPEN REDUCTION INTERNAL FIXATION RIGHT FIBULAR SHAFT WITH C-ARM performed by Kavita Zuniga MD at [place] [address]  Current Outpatient Rx   Medication Sig Dispense Refill    benzonatate (TESSALON) 200 MG capsule Take 1 capsule by mouth 3 times daily as needed for Cough 30 capsule 0    methylPREDNISolone (MEDROL, ZEYNEP,) 4 MG tablet Take 1 tablet by mouth See Admin Instructions for 6 days By mouth as directed on the package. Start this medication on 11/5/2022 1 kit 0    metoprolol tartrate (LOPRESSOR) 25 MG tablet Take 1 tablet by mouth 2 times daily 60 tablet 1    norethindrone-ethinyl estradiol (NORTREL 1/35, 21,) 1-35 MG-MCG per tablet Take 1 tablet by mouth daily Doesn't know dose      naproxen (NAPROSYN) 375 MG tablet Take 1 tablet by mouth every 12 hours as needed for Pain (Take with food) 30 tablet 0    albuterol sulfate HFA (VENTOLIN HFA) 108 (90 Base) MCG/ACT inhaler Inhale 2 puffs into the lungs 4 times daily as needed for Wheezing (Patient taking differently: Inhale 2 puffs into the lungs 4 times daily as needed for Wheezing Patient states she has lost her current inhaler) 54 g 1       ALLERGIES  Allergies   Allergen Reactions    Peanut Oil Anaphylaxis and Swelling    Nickel        Tetanus vaccination status reviewed: tetanus re-vaccination not indicated.         PHYSICAL EXAM  VITAL SIGNS: BP (!) 124/94   Pulse (!) 130   Temp 98.5 °F (36.9 °C) (Oral)   Resp 18   Ht 5' (1.524 m)   Wt 232 lb 5.8 oz (105.4 kg)   LMP 10/21/2022   SpO2 100%   BMI 45.38 kg/m²   Constitutional: Well-developed, well-nourished, appears mildly anxious but otherwise normal, nontoxic, activity: Lying on the cart, heart rate 190 speaking in full sentences. HENT: Normocephalic, Atraumatic, Bilateral external ears normal, TM's were normal, Mucus membranes are moist and oropharynx is patent and clear, No oral exudates, Nose normal.  Eyes: PERRLA, EOMI, Conjunctiva normal, No discharge. No scleral icterus. Neck: Normal range of motion, No tenderness, Supple. Lymphatic: No lymphadenopathy noted. Cardiovascular: Severely tachycardic heart rate, regular rhythm, no murmurs, no gallops, no rubs. Thorax & Lungs: Normal breath sounds, no respiratory distress, no wheezing, no rales, no rhonchi  Abdomen: Soft, Nontender, No hepatosplenomegaly, No masses, No pulsatile masses, No distension, normal bowel sounds  Skin: Warm, Dry, No erythema, No rash. Extremities: No edema, No tenderness,  no major deformities noted. Neurologic: Alert & oriented x 3,  no focal deficits noted, no clonus, no tremors. Psychiatric: Affect normal, Mood normal.    ?  LABORATORY  Labs Reviewed   CBC WITH AUTO DIFFERENTIAL - Abnormal; Notable for the following components:       Result Value    Lymphocytes Absolute 0.8 (*)     All other components within normal limits   COMPREHENSIVE METABOLIC PANEL W/ REFLEX TO MG FOR LOW K - Abnormal; Notable for the following components:    Sodium 135 (*)     Glucose 148 (*)     Creatinine <0.5 (*)     All other components within normal limits   RAPID INFLUENZA A/B ANTIGENS   COVID-19, RAPID   PREGNANCY, URINE   URINALYSIS WITH REFLEX TO CULTURE   TROPONIN   URINE DRUG SCREEN   TSH   T4, FREE       ?   EKG  EKG Interpretation    Interpreted by emergency department physician  Time performed: 9290  Time read: 1047    Rhythm: Sinus tachycardia  Ventricular Rate: 140  QRS Axis: 35  Ectopy: None  Conduction: Sinus tachycardia with early repolarization abnormalities  ST Segments: Consistent with early repolarization abnormalities  T Waves: Consistent with early repolarization abnormalities  Q Waves: None noted    Other findings: Motion artifact but EKG is readable    Compared to EKG on: None to compare    Clinical Impression: Sinus tachycardia with early repolarization abnormalities. There is motion artifact but EKG is readable. There is no old EKG to compare. Kartik 149, DO    RADIOLOGY/PROCEDURES  I personally reviewed the images for this case. XR CHEST PORTABLE   Final Result   No acute cardiopulmonary findings             COURSE & MEDICAL DECISION MAKING  Pertinent Labs, EKG, & Imaging studies reviewed. (See chart for details)    Vitals:    11/04/22 1242 11/04/22 1302 11/04/22 1312 11/04/22 1332   BP: 119/71 126/81 119/79 (!) 124/94   Pulse: (!) 126 (!) 128 (!) 114 (!) 130   Resp: 15 21 14 18   Temp:       TempSrc:       SpO2: 99% 99% 98% 100%   Weight:       Height:           SEP-1 CORE MEASURE DATA  Exclusion criteria: the patient is NOT to be included for sepsis due to: Alternative explanation for abnormal vital signs, specifically tachycardia appears to be related to supraventricular tachycardia and not sepsis        Medications   adenosine (ADENOCARD) injection 12 mg (0 mg IntraVENous Held 11/4/22 1047)   LORazepam (ATIVAN) injection 1 mg (1 mg IntraVENous Given 11/4/22 1052)   0.9 % sodium chloride bolus (0 mLs IntraVENous Stopped 11/4/22 1229)   metoprolol (LOPRESSOR) injection 5 mg (5 mg IntraVENous Given 11/4/22 1346)       New Prescriptions    BENZONATATE (TESSALON) 200 MG CAPSULE    Take 1 capsule by mouth 3 times daily as needed for Cough    METHYLPREDNISOLONE (MEDROL, ZEYNEP,) 4 MG TABLET    Take 1 tablet by mouth See Admin Instructions for 6 days By mouth as directed on the package. Start this medication on 11/5/2022    METOPROLOL TARTRATE (LOPRESSOR) 25 MG TABLET    Take 1 tablet by mouth 2 times daily       Patient remained stable in the ED.  her heart rate came down to the low 100s which she stated is \"normal\" for her. She is feeling much better. She just wanted some for her cough. Therefore, patient was discharged on [address] She was placed on a Medrol Dosepak and a low-dose of Lopressor 25 mg twice a day. She was given referrals to cardiology, Dr. Mony Fink, and PCP. She was instructed to return to the emergency department for any problems. The patient's blood pressure was found to be elevated according to CMS/Medicare and the Affordable Care Act/[facility] criteria. Elevated blood pressure could occur because of pain or anxiety or other reasons and does not mean that they need to have their blood pressure treated or medications otherwise adjusted. However, this could also be a sign that they will need to have their blood pressure treated or medications changed. The patient was instructed to follow up closely with their personal physician to have their blood pressure rechecked. The patient was instructed to take a list of recent blood pressure readings to their next visit with their personal physician. See discharge instructions for specific medications, discharge information, and treatments. They were verbally instructed to return to emergency if any problems. (This chart has been completed using [address]. Although attempts have been made to ensure accuracy, words and/or phrases may not be transcribed as intended.)    Patient refused pain medicines at the time of their exam.    IMPRESSION(S):  1. Viral URI with cough    2. Paroxysmal supraventricular tachycardia ([address]    3. Sinus tachycardia    4. Lab test negative for COVID-19 virus        ?   Recheck Times: 1552, 0406    Diagnostic considerations include but are not limited to:  myocardial infarction, pulmonary embolus, pneumothorax, pneumonia, aortic dissection, empyema, musculoskeletal chest pain, pulmonary contusion, pericardial effusion, pericarditis, GERD, pancreatitis, stomach ulcer, and referred abdominal pain.          Sandi Baker DO  11/04/22 3306

## 2022-11-04 NOTE — ED NOTES
Pt states over the past few years her resting heart rate has been about [address], RN  11/04/22 3550

## 2022-11-04 NOTE — ED NOTES
Patient reports episode of lightheadedness and HR noted to spike to 180-190. Patient reports ongoing anxiety. Attempted vagal maneuvers and de-escalation with patient without success. MD to bedside. Verbal order received for EKG and adenocard. This RN to retrieved medication as order. Nadya RN to bedside for EKG. On return to room, patient HR noted to be 140, patient reports anxiety is resolving. MD aware.      Amanda Mcclure RN  11/04/22 1040

## 2022-11-05 LAB
EKG ATRIAL RATE: 140 BPM
EKG DIAGNOSIS: NORMAL
EKG P AXIS: 47 DEGREES
EKG P-R INTERVAL: 144 MS
EKG Q-T INTERVAL: 262 MS
EKG QRS DURATION: 72 MS
EKG QTC CALCULATION (BAZETT): 399 MS
EKG R AXIS: 35 DEGREES
EKG T AXIS: 3 DEGREES
EKG VENTRICULAR RATE: 140 BPM

## 2022-11-05 PROCEDURE — 93010 ELECTROCARDIOGRAM REPORT: CPT | Performed by: INTERNAL MEDICINE

## 2022-11-05 ASSESSMENT — ENCOUNTER SYMPTOMS
ABDOMINAL PAIN: 0
NAUSEA: 0
SHORTNESS OF BREATH: 1
VOMITING: 0

## 2022-11-05 ASSESSMENT — PAIN - FUNCTIONAL ASSESSMENT: PAIN_FUNCTIONAL_ASSESSMENT: NONE - DENIES PAIN

## 2022-11-05 NOTE — DISCHARGE INSTRUCTIONS
Your prescription has been sent to [facility]. Be sure to contact your primary care provider's office to arrange for a follow up visit. See your therapist as scheduled next week. It's probably a good idea to follow up with the doctor that you were referred to at your hospital visit from earlier today. If you have any new or worsening issues after going home don't hesitate to return here for reevaluation at any time 24/7!

## 2022-11-06 NOTE — ED PROVIDER NOTES
[facility] EMERGENCY DEPARTMENT    Name: Migel Torres : 1995 MRN: [id number] Date of Service: 2022    CC: palpitations, chest pain, stress, anxiety    HPI: this patient is a 24 y.o. female presenting to the ED from home. Ms. Santosh Voss tells me that earlier today she was seen at an outlying emergency department and found to have a rapid heart rate. She reports that she was treated with IV medication to lower her HR x 3 and discharged home. 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 She was discharged home. This evening she noticed that, once again, her heart seemed to be beating very rapidly. Soon thereafter she began to feel sharp but mild, stabbing pains in the center of her chest. With time her breathing seemed to get increasingly rapid and labored. These symptoms concerned her so she came here to be reevaluated this evening. On further questioning Ms. Santosh Voss adds that she has previously been diagnosed with PTSD and an anxiety disorder. She notes that she is not currently on any pharmacological therapies for these conditions but she has been seeing a counselor about once weekly for the last 2 months. She reports that she currently has a lot of stressors related to her family life and the loss of a job that she enjoyed. She feels that she has been intensely anxious for weeks. She finds herself restless with racing thoughts quite often. At nighttime she has trouble falling asleep and then awakes once every 1-2 hours feeling panicked. She wonders if anxiety could be contributing to her recent symptoms and her elevated heart rate. She has not appreciated other changes from her usual state of health and she denies other current complaints. She does not believe that she has had any similar episodes like this prior to today.  Of note, she is currently taking oral contraceptive pills.  _____________________________________________________________________    Past Medical History:   Diagnosis Date    Anxiety disorder     Class 3 obesity (HCC)     Depressive disorder     Hypertension     PCOS (polycystic ovarian syndrome)     Personality disorder (HCC)     PTSD (post-traumatic stress disorder)     Vitamin D deficiency        Past Surgical History:   Procedure Laterality Date    FIBULA FRACTURE SURGERY Right 4/29/2019    OPEN REDUCTION INTERNAL FIXATION RIGHT FIBULAR SHAFT WITH C-ARM performed by Steve Fowler MD at [address] History     Tobacco Use    Smoking status: Never    Smokeless tobacco: Never   Vaping Use    Vaping Use: Never used   Substance Use Topics    Alcohol use: Not Currently     Alcohol/week: 0.0 - 2.0 standard drinks    Drug use: Not Currently     Types: Marijuana Shellye Burkitt)       Family History   Problem Relation Age of Onset    Cancer Mother     Cancer Father     Heart Disease Father      _____________________________________________________________________    Review of Systems   Constitutional:  Negative for chills, fatigue and fever. HENT:  Negative for hearing loss and tinnitus. Respiratory:  Positive for shortness of breath. Cardiovascular:  Positive for chest pain and palpitations. Negative for leg swelling. Gastrointestinal:  Negative for abdominal pain, nausea and vomiting. Genitourinary:  Negative for decreased urine volume. Musculoskeletal:  Negative for gait problem. Skin:  Negative for rash. Neurological:  Negative for dizziness, weakness, numbness and headaches. Psychiatric/Behavioral:  Positive for sleep disturbance. Negative for dysphoric mood. The patient is nervous/anxious. Physical Exam  Constitutional:       General: She is awake. She is not in acute distress. Appearance: She is not ill-appearing, toxic-appearing or diaphoretic. HENT:      Head: Normocephalic and atraumatic.    Eyes:      Conjunctiva/sclera: Conjunctivae normal.   Neck:      Vascular: No JVD. Cardiovascular:      Rate and Rhythm: Regular rhythm. Tachycardia present. Pulses:           Radial pulses are 2+ on the right side and 2+ on the left side. Heart sounds: Normal heart sounds. No murmur heard. Pulmonary:      Effort: Pulmonary effort is normal. No accessory muscle usage. Breath sounds: Normal breath sounds. Abdominal:      General: Bowel sounds are normal. There is no distension. Palpations: Abdomen is soft. Tenderness: There is no abdominal tenderness. There is no guarding or rebound. Musculoskeletal:      Right lower leg: No swelling or tenderness. No edema. Left lower leg: No swelling or tenderness. No edema. Skin:     General: Skin is warm and dry. Coloration: Skin is not cyanotic, mottled or pale. Neurological:      Mental Status: She is alert and oriented to person, place, and time. Sensory: Sensation is intact. No sensory deficit. Motor: Motor function is intact. No weakness. Gait: Gait is intact. Gait normal.   Psychiatric:         Attention and Perception: Attention normal.         Mood and Affect: Mood is anxious. Affect is tearful. Affect is not inappropriate. Speech: Speech is rapid and pressured. Speech is not delayed or slurred. Behavior: Behavior is hyperactive. Behavior is not agitated. Behavior is cooperative. Thought Content: Thought content normal.         Cognition and Memory: Cognition is not impaired.  Memory is not impaired.     _____________________________________________________________________    RESULTS:    Results for orders placed or performed during the hospital encounter of 11/04/22   CBC with Auto Differential   Result Value Ref Range    WBC 6.2 4.0 - 11.0 K/uL    RBC 4.71 4.00 - 5.20 M/uL    Hemoglobin 12.6 12.0 - 16.0 g/dL    Hematocrit 38.9 36.0 - 48.0 %    MCV 82.7 80.0 - 100.0 fL    MCH 26.7 26.0 - 34.0 pg    MCHC 32.3 31.0 - 36.0 g/dL    RDW 14.8 12.4 - 15.4 %    Platelets 124 765 - 873 K/uL    MPV 7.3 5.0 - 10.5 fL    Neutrophils % 77.2 %    Lymphocytes % 12.4 %    Monocytes % 9.6 %    Eosinophils % 0.2 %    Basophils % 0.6 %    Neutrophils Absolute 4.8 1.7 - 7.7 K/uL    Lymphocytes Absolute 0.8 (L) 1.0 - 5.1 K/uL    Monocytes Absolute 0.6 0.0 - 1.3 K/uL    Eosinophils Absolute 0.0 0.0 - 0.6 K/uL    Basophils Absolute 0.0 0.0 - 0.2 K/uL   Basic Metabolic Panel w/ Reflex to MG   Result Value Ref Range    Sodium 135 (L) 136 - 145 mmol/L    Potassium reflex Magnesium 3.2 (L) 3.5 - 5.1 mmol/L    Chloride 101 99 - 110 mmol/L    CO2 19 (L) 21 - 32 mmol/L    Anion Gap 15 3 - 16    Glucose 149 (H) 70 - 99 mg/dL    BUN 8 7 - 20 mg/dL    Creatinine <0.5 (L) 0.6 - 1.1 mg/dL    Est, Glom Filt Rate >60 >60    Calcium 8.9 8.3 - 10.6 mg/dL   D-Dimer, Quantitative   Result Value Ref Range    D-Dimer, Quant 0.56 0.00 - 0.60 ug/mL FEU   Troponin   Result Value Ref Range    Troponin <0.01 <0.01 ng/mL   Magnesium   Result Value Ref Range    Magnesium 1.60 (L) 1.80 - 2.40 mg/dL       CT CHEST PULMONARY EMBOLISM W CONTRAST   Final Result   No acute pulmonary abnormality. Suboptimal opacification of the pulmonary   arteries but no central pulmonary embolism. _____________________________________________________________________    Is this patient to be included in the SEP-1 Core Measure? No  _____________________________________________________________________    MEDICAL DECISION MAKING & PLANS:    I reviewed the patient's recent and/or pertinent records, current medications, nurses notes, allergies, and vital signs. Differential Diagnosis:   Anxiety disorder, panic disorder, panic attack  Arrhythmia  Pulmonary embolism  Hypovolemia, electrolyte abnormality  Low suspicion for ischemic heart disease    Labs & Studies:  Labs  EKG  CTA chest    Treatments:  Medications   ondansetron (ZOFRAN) injection 4 mg (4 mg IntraVENous Given 11/4/22 2123) LORazepam (ATIVAN) injection 1 mg (1 mg IntraVENous Given 11/4/22 2123)   iopamidol (ISOVUE-370) 76 % injection 75 mL (75 mLs IntraVENous Given 11/4/22 2209)   0.9 % sodium chloride bolus (0 mLs IntraVENous Stopped 11/5/22 0010)   potassium chloride (KLOR-CON) extended release tablet 40 mEq (30 mEq Oral Given 11/4/22 2256)   magnesium sulfate 1000 mg in dextrose 5% 100 mL IVPB (0 mg IntraVENous Stopped 11/5/22 0010)   magnesium oxide (MAG-OX) tablet 400 mg (400 mg Oral Given 11/4/22 2256)   potassium chloride (KLOR-CON) extended release tablet 10 mEq (10 mEq Oral Given 11/4/22 2307)     Discharge Medication List as of 11/5/2022 12:16 AM        START taking these medications    Details   FLUoxetine (PROZAC) 20 MG capsule Take 1 capsule by mouth daily, Disp-30 capsule, R-2Print      metoprolol succinate (TOPROL XL) 25 MG extended release tablet Take 1 tablet by mouth daily, Disp-30 tablet, R-0Print      magnesium oxide (MAG-OX) 400 MG tablet Take 1 tablet by mouth daily for 5 days, Disp-5 tablet, R-0Print      potassium chloride (KLOR-CON M) 10 MEQ extended release tablet Take 2 tablets by mouth daily for 5 days, Disp-10 tablet, R-0Print      hydrOXYzine pamoate (VISTARIL) 25 MG capsule Take 1 capsule by mouth every 8 hours as needed for Anxiety, Disp-10 capsule, R-0Print           Disposition:  Ms. Frost Serum is well-appearing on reevaluation. She reports that her presenting symptoms have improved and are well-controlled now. Her HR was initially elevated in the 140-155 range but has improved to the 110-115 range now. Initial lab testing revealed a minimally positive D-Dimer level. Fortunately a subsequent CTA chest is negative for PE or other acute findings. Labs also show multiple electrolyte deficiencies. The remainder of her ED evaluation results are largely reassuring. Discussed results, potential etiologies of her elevate HR, Dxs, and expected clinical course with her at length.  She is comfortable with DC home now. She expressed concerns about her ability to maintain compliance with twice daily medication that was prescribed to her at her ED visit earlier today so we will switch from immediate release to extended release Metoprolol. She expressed strong desire to begin medication for anxiety so we discussed the R/B/A of beginning a SSRI and she would like to proceed with this intervention as well. Return precautions reviewed in detail and outpatient follow up advised. _____________________________________________________________________    Clinical Impression(s)  1. Tachycardia    2. Hypomagnesemia    3. Hypokalemia    4. Anxiety state        Provider Signature: MD Catalino Jurado MD  11/05/22 2033

## 2022-11-07 LAB
EKG ATRIAL RATE: 147 BPM
EKG DIAGNOSIS: NORMAL
EKG P AXIS: 46 DEGREES
EKG P-R INTERVAL: 138 MS
EKG Q-T INTERVAL: 256 MS
EKG QRS DURATION: 72 MS
EKG QTC CALCULATION (BAZETT): 400 MS
EKG R AXIS: 28 DEGREES
EKG T AXIS: -14 DEGREES
EKG VENTRICULAR RATE: 147 BPM

## 2022-11-07 PROCEDURE — 93010 ELECTROCARDIOGRAM REPORT: CPT | Performed by: INTERNAL MEDICINE

## 2022-11-09 ENCOUNTER — TELEPHONE (OUTPATIENT)
Dept: CARDIOLOGY CLINIC | Age: 27
End: 2022-11-09

## 2022-11-09 NOTE — TELEPHONE ENCOUNTER
Please reach out to patient and notify change in her appointment on Friday 11/11 from 345 pm to 4 pm.

## 2022-11-10 ENCOUNTER — HOSPITAL ENCOUNTER (EMERGENCY)
Age: 27
Discharge: HOME | End: 2022-11-10
Payer: COMMERCIAL

## 2022-11-10 ENCOUNTER — APPOINTMENT (OUTPATIENT)
Dept: GENERAL RADIOLOGY | Age: 27
End: 2022-11-10
Payer: COMMERCIAL

## 2022-11-10 VITALS
SYSTOLIC BLOOD PRESSURE: 122 MMHG | RESPIRATION RATE: 25 BRPM | TEMPERATURE: 98.1 F | DIASTOLIC BLOOD PRESSURE: 83 MMHG | HEART RATE: 90 BPM | OXYGEN SATURATION: 99 %

## 2022-11-10 DIAGNOSIS — F41.0 PANIC ATTACK: Primary | ICD-10-CM

## 2022-11-10 LAB
AMPHETAMINE SCREEN, URINE: NORMAL
ANION GAP SERPL CALCULATED.3IONS-SCNC: 17 MMOL/L (ref 3–16)
BACTERIA: ABNORMAL /HPF
BARBITURATE SCREEN URINE: NORMAL
BENZODIAZEPINE SCREEN, URINE: NORMAL
BILIRUBIN URINE: NEGATIVE
BLOOD, URINE: ABNORMAL
BUN BLDV-MCNC: 9 MG/DL (ref 7–20)
CALCIUM SERPL-MCNC: 9.9 MG/DL (ref 8.3–10.6)
CANNABINOID SCREEN URINE: NORMAL
CHLORIDE BLD-SCNC: 102 MMOL/L (ref 99–110)
CLARITY: CLEAR
CO2: 20 MMOL/L (ref 21–32)
COCAINE METABOLITE SCREEN URINE: NORMAL
COLOR: YELLOW
CREAT SERPL-MCNC: <0.5 MG/DL (ref 0.6–1.1)
EKG ATRIAL RATE: 86 BPM
EKG DIAGNOSIS: NORMAL
EKG P AXIS: 24 DEGREES
EKG P-R INTERVAL: 150 MS
EKG Q-T INTERVAL: 356 MS
EKG QRS DURATION: 78 MS
EKG QTC CALCULATION (BAZETT): 426 MS
EKG R AXIS: 21 DEGREES
EKG T AXIS: 25 DEGREES
EKG VENTRICULAR RATE: 86 BPM
EPITHELIAL CELLS, UA: 2 /HPF (ref 0–5)
FENTANYL SCREEN, URINE: NORMAL
GFR SERPL CREATININE-BSD FRML MDRD: >60 ML/MIN/{1.73_M2}
GLUCOSE BLD-MCNC: 106 MG/DL (ref 70–99)
GLUCOSE URINE: NEGATIVE MG/DL
HCG QUALITATIVE: NEGATIVE
HCG(URINE) PREGNANCY TEST: NEGATIVE
HYALINE CASTS: 1 /LPF (ref 0–8)
KETONES, URINE: 15 MG/DL
LEUKOCYTE ESTERASE, URINE: NEGATIVE
Lab: NORMAL
METHADONE SCREEN, URINE: NORMAL
MICROSCOPIC EXAMINATION: YES
NITRITE, URINE: NEGATIVE
OPIATE SCREEN URINE: NORMAL
OXYCODONE URINE: NORMAL
PH UA: 8 (ref 5–8)
PH UA: 8.5
PHENCYCLIDINE SCREEN URINE: NORMAL
POTASSIUM REFLEX MAGNESIUM: 3.9 MMOL/L (ref 3.5–5.1)
PROTEIN UA: NEGATIVE MG/DL
RBC UA: 37 /HPF (ref 0–4)
SODIUM BLD-SCNC: 139 MMOL/L (ref 136–145)
SPECIFIC GRAVITY UA: 1.01 (ref 1–1.03)
URINE REFLEX TO CULTURE: ABNORMAL
URINE TYPE: ABNORMAL
UROBILINOGEN, URINE: 0.2 E.U./DL
WBC UA: 2 /HPF (ref 0–5)

## 2022-11-10 PROCEDURE — 84703 CHORIONIC GONADOTROPIN ASSAY: CPT

## 2022-11-10 PROCEDURE — 93010 ELECTROCARDIOGRAM REPORT: CPT | Performed by: INTERNAL MEDICINE

## 2022-11-10 PROCEDURE — 99285 EMERGENCY DEPT VISIT HI MDM: CPT

## 2022-11-10 PROCEDURE — 6370000000 HC RX 637 (ALT 250 FOR IP): Performed by: PHYSICIAN ASSISTANT

## 2022-11-10 PROCEDURE — 80307 DRUG TEST PRSMV CHEM ANLYZR: CPT

## 2022-11-10 PROCEDURE — 81001 URINALYSIS AUTO W/SCOPE: CPT

## 2022-11-10 PROCEDURE — 80048 BASIC METABOLIC PNL TOTAL CA: CPT

## 2022-11-10 PROCEDURE — 71045 X-RAY EXAM CHEST 1 VIEW: CPT

## 2022-11-10 PROCEDURE — 93005 ELECTROCARDIOGRAM TRACING: CPT | Performed by: PHYSICIAN ASSISTANT

## 2022-11-10 PROCEDURE — 36415 COLL VENOUS BLD VENIPUNCTURE: CPT

## 2022-11-10 RX ORDER — ONDANSETRON 4 MG/1
4 TABLET, ORALLY DISINTEGRATING ORAL ONCE
Status: COMPLETED | OUTPATIENT
Start: 2022-11-10 | End: 2022-11-10

## 2022-11-10 RX ADMIN — ONDANSETRON 4 MG: 4 TABLET, ORALLY DISINTEGRATING ORAL at 14:14

## 2022-11-10 ASSESSMENT — PAIN - FUNCTIONAL ASSESSMENT
PAIN_FUNCTIONAL_ASSESSMENT: NONE - DENIES PAIN
PAIN_FUNCTIONAL_ASSESSMENT: NONE - DENIES PAIN

## 2022-11-10 ASSESSMENT — PAIN SCALES - GENERAL: PAINLEVEL_OUTOF10: 0

## 2022-11-10 NOTE — ED PROVIDER NOTES
[address] [facility]        Pt Name: Nir Deng  MRN: [id number]  [place] 1995  Date of evaluation: 11/10/2022  Provider: TREVIN Cain  PCP: Bhupinder Cooley  Note Started: 5:45 PM EST     The ED Attending Physician was available for consultation but did not see or evaluate this patient. CHIEF COMPLAINT       Chief Complaint   Patient presents with    Panic Attack     Panic attack at home today 1 hour ago, calm now, recent diagnosis of SVT, recent change in anxiety medication    Nausea     Nauseous, nervous about emesis       HISTORY OF PRESENT ILLNESS   (Location, Timing/Onset, Context/Setting, Quality, Duration, Modifying Factors, Severity, Associated Signs and Symptoms)  Note limiting factors. Nir Deng is a 24 y.o. female who presents with complaint of panic attack, nausea, and possible tachycardia. Patient has had a couple of ED visits recently for her anxiety issues, but was also noted to be markedly tachycardic at 1 of these visits, in episodes of SVT, was given the beta-blockers to control this, was discharged with a prescription for metoprolol. She has a cardiology appointment tomorrow. Also says that she saw her primary care provider yesterday, was prescribed a new medication to take for her anxiety, but she has not started taking it yet because she is still taking another 1 and she is not sure how to switch. She says there is nothing abnormal about this morning otherwise except that she began to feel severely anxious and had a panic attack that lasted for several minutes. Says her chest is little bit tight but no specific chest pain or significant shortness of breath. She does feel nauseous but no vomiting, no abdominal pain. Denies any intention to harm herself. Nursing Notes were all reviewed and agreed with or any disagreements were addressed in the HPI.     REVIEW OF SYSTEMS    (2-9 systems for level 4, 10 or more for level 5)     Positives and pertinent negatives as per HPI.      PAST MEDICAL HISTORY     Past Medical History:   Diagnosis Date    Anxiety disorder     Class 3 obesity (HCC)     Depressive disorder     Hypertension     PCOS (polycystic ovarian syndrome)     Personality disorder (HCC)     PTSD (post-traumatic stress disorder)     Vitamin D deficiency        SURGICAL HISTORY     Past Surgical History:   Procedure Laterality Date    FIBULA FRACTURE SURGERY Right 4/29/2019    OPEN REDUCTION INTERNAL FIXATION RIGHT FIBULAR SHAFT WITH C-ARM performed by Melita Akins MD at [address] [place] [address]       Previous Medications    BENZONATATE (TESSALON) 200 MG CAPSULE    Take 1 capsule by mouth 3 times daily as needed for Cough    FLUOXETINE (PROZAC) 20 MG CAPSULE    Take 1 capsule by mouth daily    HYDROXYZINE PAMOATE (VISTARIL) 25 MG CAPSULE    Take 1 capsule by mouth every 8 hours as needed for Anxiety    METOPROLOL SUCCINATE (TOPROL XL) 25 MG EXTENDED RELEASE TABLET    Take 1 tablet by mouth daily    NORETHINDRONE-ETHINYL ESTRADIOL ([place] 1/35, 21,) 1-35 MG-MCG PER TABLET    Take 1 tablet by mouth daily Doesn't know dose    POTASSIUM CHLORIDE (KLOR-CON M) 10 MEQ EXTENDED RELEASE TABLET    Take 2 tablets by mouth daily for 5 days       ALLERGIES     Peanut oil and Nickel    FAMILYHISTORY       Family History   Problem Relation Age of Onset    Cancer Mother     Cancer Father     Heart Disease Father         SOCIAL HISTORY       Social History     Tobacco Use    Smoking status: Never    Smokeless tobacco: Never   Vaping Use    Vaping Use: Never used   Substance Use Topics    Alcohol use: Not Currently     Alcohol/week: 0.0 - 2.0 standard drinks    Drug use: Not Currently     Types: Marijuana (Weed)       SCREENINGS    [place] Coma Scale  Eye Opening: Spontaneous  Best Verbal Response: Oriented  Best Motor Response: Obeys commands  [place] Coma Scale Score: 15      PHYSICAL EXAM    (up to 7 for level 4, 8 or more for level 5)     ED Triage Vitals [11/10/22 1317]   BP Temp Temp Source Heart Rate Resp SpO2 Height Weight   (!) 152/96 98.1 °F (36.7 °C) Oral 97 16 99 % -- --       Physical Exam  Vitals and nursing note reviewed. Constitutional:       General: She is not in acute distress. Appearance: Normal appearance. She is not ill-appearing. HENT:      Head: Normocephalic and atraumatic. Nose: Nose normal.   Eyes:      General:         Right eye: No discharge. Left eye: No discharge. Cardiovascular:      Rate and Rhythm: Normal rate and regular rhythm. Heart sounds: Normal heart sounds. Pulmonary:      Effort: Pulmonary effort is normal. No respiratory distress. Breath sounds: Normal breath sounds. No stridor. No wheezing, rhonchi or rales. Musculoskeletal:         General: Normal range of motion. Cervical back: Normal range of motion. Skin:     General: Skin is warm and dry. Neurological:      General: No focal deficit present. Mental Status: She is alert and oriented to person, place, and time. Psychiatric:         Mood and Affect: Mood normal.         Behavior: Behavior normal.       DIAGNOSTIC RESULTS   LABS:    Labs Reviewed   BASIC METABOLIC PANEL W/ REFLEX TO MG FOR LOW K - Abnormal; Notable for the following components:       Result Value    CO2 20 (*)     Anion Gap 17 (*)     Glucose 106 (*)     Creatinine <0.5 (*)     All other components within normal limits   URINALYSIS WITH REFLEX TO CULTURE - Abnormal; Notable for the following components:    Ketones, Urine 15 (*)     Blood, Urine MODERATE (*)     All other components within normal limits   MICROSCOPIC URINALYSIS - Abnormal; Notable for the following components:    RBC, UA 37 (*)     All other components within normal limits   PREGNANCY, URINE   URINE DRUG SCREEN   HCG, SERUM, QUALITATIVE       When ordered only abnormal lab results are displayed.  All other labs were within normal range or not returned as of this dictation. EKG: When ordered, EKG's are interpreted by the Emergency Department Physician in the absence of a cardiologist.  Please see their note for interpretation of EKG. RADIOLOGY:   All images such as plain radiographs, CT, Ultrasound and MRI are interpreted by a radiologist. Some images are visualized and preliminarily interpreted by me and/or the ED attending physician. Interpretation per the radiologist below, if available at the time of this note:    XR CHEST PORTABLE   Final Result   No radiographic evidence of acute pulmonary disease. CONSULTS:  None    PROCEDURES   Unless otherwise noted below, none. Procedures    EMERGENCY DEPARTMENT COURSE and DIFFERENTIAL DIAGNOSIS/MDM:   Vitals:    Vitals:    11/10/22 1500 11/10/22 1515 11/10/22 1530 11/10/22 1545   BP: 127/86 (!) 145/97 133/88 132/86   Pulse: 98 99 97 88   Resp: 15 14 21 16   Temp:       TempSrc:       SpO2: 99% 100% 100% 100%       Patient was given the following medications:  Medications   ondansetron (ZOFRAN-ODT) disintegrating tablet 4 mg (4 mg Oral Given 11/10/22 1414)           Is this patient to be included in the SEP-1 Core Measure due to severe sepsis or septic shock? No   Exclusion criteria - the patient is NOT to be included for SEP-1 Core Measure due to: Infection is not suspected    Patient had normal vital signs while in the emergency department not tachycardic. She is not noted to have low potassium magnesium at prior visit prescribed supplements, but today they are normal and she has completed the supplementation. EKG was nonconcerning. Pregnancy test negative. Patient likely experienced a panic attack, and she tells me that she had previously been taking Prozac, was told by her primary care provider to replace it with Lexapro, but she the latter medication can cause a fast heart rate, and she does not think she is to take that given her recent experience with SVT.   There is no indication for hospitalization or further work-up here today. She has a cardiology appointment tomorrow and I advised her to keep this appointment. I also advised her to go ahead and take her new SSRI prescription tomorrow and stop taking the old one, and discussed these medications with cardiology and with her prescribing primary care provider. The patient verbalized understanding and agreement with this plan of care. The patient was advised to return to the emergency department if symptoms should significantly worsen or if new and concerning symptoms should appear. I estimate there is LOW risk for SUICIDAL BEHAVIOR, HOMICIDAL BEHAVIOR, PSYCHOSIS, DANGEROUS OR VIOLENT BEHAVIOR, DISORIENTATION, OR MENTAL HEALTH CONDITION REQUIRING HOSPITALIZATION, thus I consider the discharge disposition reasonable. CRITICAL CARE TIME   None    FINAL IMPRESSION      1.  Panic attack          DISPOSITION/PLAN   DISPOSITION Decision To Discharge 11/10/2022 05:19:47 PM      PATIENT REFERRED TO:  your cardiologist    Go in 1 day  keep your scheduled appointment    your family doctor or primary care provider    Call   to discuss your medications      DISCHARGE MEDICATIONS:  New Prescriptions    No medications on file       DISCONTINUED MEDICATIONS:  Discontinued Medications    No medications on file            (Please note that portions of this note were completed with a voice recognition program.  Efforts were made to edit the dictations but occasionally words are mis-transcribed.)    TREVIN Soares (electronically signed)       Rosalina Soares  11/10/22 7487

## 2022-11-10 NOTE — ED NOTES
Pt ambulated to the restroom with staff standby. Gait even and steady.      Taylor Warren RN  11/10/22 0800

## 2022-11-10 NOTE — TELEPHONE ENCOUNTER
I called and spoke to Maurice Garcia to notify change in appointment time to 4 pm tomorrow, 11/11/22, she v/u

## 2022-11-10 NOTE — ED NOTES
Pt discharged at this time. Discharge instructions and medications reviewed,  Questions were answered. PT verbalized understanding. VSS, Afebrile. Follow up appointments were discussed.            Narcisa Rizzo RN  11/10/22 7522

## 2022-11-10 NOTE — ED NOTES
Pt apprehensive to give urine sample stating she is on her menstrual cycle.       Ricardo Campos RN  11/10/22 0858

## 2022-11-10 NOTE — ED NOTES
Pt states she is still nauseous and becoming more anxious. Provider updated.      Khadra Olivier RN  11/10/22 2490

## 2022-11-11 ENCOUNTER — OFFICE VISIT (OUTPATIENT)
Dept: CARDIOLOGY CLINIC | Age: 27
End: 2022-11-11
Payer: COMMERCIAL

## 2022-11-11 VITALS
HEART RATE: 90 BPM | WEIGHT: 220 LBS | HEIGHT: 60 IN | BODY MASS INDEX: 43.19 KG/M2 | OXYGEN SATURATION: 98 % | SYSTOLIC BLOOD PRESSURE: 116 MMHG | DIASTOLIC BLOOD PRESSURE: 70 MMHG

## 2022-11-11 DIAGNOSIS — R00.2 PALPITATIONS: Primary | ICD-10-CM

## 2022-11-11 PROCEDURE — G8417 CALC BMI ABV UP PARAM F/U: HCPCS | Performed by: INTERNAL MEDICINE

## 2022-11-11 PROCEDURE — 99204 OFFICE O/P NEW MOD 45 MIN: CPT | Performed by: INTERNAL MEDICINE

## 2022-11-11 PROCEDURE — G8427 DOCREV CUR MEDS BY ELIG CLIN: HCPCS | Performed by: INTERNAL MEDICINE

## 2022-11-11 PROCEDURE — G8484 FLU IMMUNIZE NO ADMIN: HCPCS | Performed by: INTERNAL MEDICINE

## 2022-11-11 PROCEDURE — 93246 EXT ECG>7D<15D RECORDING: CPT | Performed by: NURSE PRACTITIONER

## 2022-11-11 PROCEDURE — 1036F TOBACCO NON-USER: CPT | Performed by: INTERNAL MEDICINE

## 2022-11-11 PROCEDURE — 93000 ELECTROCARDIOGRAM COMPLETE: CPT | Performed by: INTERNAL MEDICINE

## 2022-11-11 RX ORDER — ESCITALOPRAM OXALATE 20 MG/1
TABLET ORAL
COMMUNITY
Start: 2022-11-11

## 2022-11-11 RX ORDER — NORETHINDRONE AND ETHINYL ESTRADIOL 1; .035 MG/1; MG/1
1 TABLET ORAL DAILY
Qty: 1 PACKET | COMMUNITY
Start: 2022-11-11
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
## 2022-11-14 ENCOUNTER — TELEPHONE (OUTPATIENT)
Dept: CARDIOLOGY CLINIC | Age: 27
End: 2022-11-14

## 2022-11-14 NOTE — TELEPHONE ENCOUNTER
Pt received holter in office 11/11/2022. Instructions were given to pt including patch placement, showering, diary instructions and monitor return. Pt v/u. Monitor placed by [address] [place] [address]  Length of monitor 14 days  Monitor ordered by Dr. Nanci Acosta  Serial number [id number] [facility]  Kit ID N/A  Activation successful prior to pt leaving office? Yes        Monitor placed on spreadsheet.

## 2022-11-22 ENCOUNTER — TELEPHONE (OUTPATIENT)
Dept: CARDIOLOGY CLINIC | Age: 27
End: 2022-11-22

## 2022-11-22 NOTE — TELEPHONE ENCOUNTER
Spoke with patient she stated that she experienced her symptoms of palpitations 4-5 times while wearing the monitor. Instructed to return monitor and MD will review data and we will call her with the results. She reported having bloody stool and asked if could be a SE of her Metoprolol advised it is not and to contact her PCP to discuss her symptoms. Verbalized understanding.

## 2022-11-22 NOTE — TELEPHONE ENCOUNTER
Lexii Eliezerrosemarie called in this morning, she states she is on her second 15 monitor and this one has fallen off, she has four days left for this monitor, she states she is severly broken out and its like her skin has hives and is very itchy. She states she will not be putting it back on, she would like a call concerning this. She can be reached at [phone].

## 2022-12-06 PROCEDURE — 93248 EXT ECG>7D<15D REV&INTERPJ: CPT | Performed by: NURSE PRACTITIONER

## 2022-12-07 DIAGNOSIS — R00.2 PALPITATIONS: ICD-10-CM

## 2022-12-07 NOTE — TELEPHONE ENCOUNTER
Tavia Garrison called back in this afternoon after stating that Khalif Roberts just called her to go over her monitor results which were normal. Tavia Garrison is stating that she has been off of work since 11/4/22. She is saying that her employer needs a letter saying her monitor results were normal and she can return back to work. She asked that we e-mail the letter   [email or web address]      You can reach Tavia Garrison at #[phone]

## 2022-12-07 NOTE — TELEPHONE ENCOUNTER
There is no cardiac contraindications with her returning to work. If she needs a letter please prepare.

## 2022-12-09 NOTE — TELEPHONE ENCOUNTER
Called pt in regards to message below and she states she got it on my chart. Advised her to give us a call if they need anything else.

## 2023-11-06 ENCOUNTER — APPOINTMENT (OUTPATIENT)
Dept: GENERAL RADIOLOGY | Age: 28
End: 2023-11-06
Payer: COMMERCIAL

## 2023-11-06 ENCOUNTER — HOSPITAL ENCOUNTER (EMERGENCY)
Age: 28
Discharge: HOME | End: 2023-11-06
Attending: EMERGENCY MEDICINE
Payer: COMMERCIAL

## 2023-11-06 VITALS
SYSTOLIC BLOOD PRESSURE: 128 MMHG | TEMPERATURE: 99 F | HEIGHT: 60 IN | BODY MASS INDEX: 50.42 KG/M2 | WEIGHT: 256.84 LBS | DIASTOLIC BLOOD PRESSURE: 76 MMHG | OXYGEN SATURATION: 100 % | HEART RATE: 111 BPM | RESPIRATION RATE: 20 BRPM

## 2023-11-06 DIAGNOSIS — M25.572 ACUTE LEFT ANKLE PAIN: Primary | ICD-10-CM

## 2023-11-06 PROCEDURE — 99283 EMERGENCY DEPT VISIT LOW MDM: CPT

## 2023-11-06 PROCEDURE — 73610 X-RAY EXAM OF ANKLE: CPT

## 2023-11-06 PROCEDURE — 6370000000 HC RX 637 (ALT 250 FOR IP): Performed by: EMERGENCY MEDICINE

## 2023-11-06 RX ORDER — NAPROXEN 500 MG/1
500 TABLET ORAL 2 TIMES DAILY WITH MEALS
Qty: 60 TABLET | Refills: 5 | Status: SHIPPED | OUTPATIENT
Start: 2023-11-06

## 2023-11-06 RX ORDER — LIDOCAINE 4 G/G
1 PATCH TOPICAL ONCE
Status: DISCONTINUED | OUTPATIENT
Start: 2023-11-06 | End: 2023-11-07 | Stop reason: HOSPADM

## 2023-11-06 RX ORDER — LIDOCAINE 4 G/G
1 PATCH TOPICAL DAILY
Qty: 30 PATCH | Refills: 0 | Status: SHIPPED | OUTPATIENT
Start: 2023-11-06 | End: 2023-12-06

## 2023-11-06 RX ORDER — NAPROXEN 250 MG/1
500 TABLET ORAL ONCE
Status: COMPLETED | OUTPATIENT
Start: 2023-11-06 | End: 2023-11-06

## 2023-11-06 RX ORDER — CEPHALEXIN 500 MG/1
500 CAPSULE ORAL ONCE
Status: DISCONTINUED | OUTPATIENT
Start: 2023-11-06 | End: 2023-11-06

## 2023-11-06 RX ADMIN — NAPROXEN SODIUM 500 MG: 250 TABLET ORAL at 22:01

## 2024-04-24 ENCOUNTER — APPOINTMENT (OUTPATIENT)
Dept: CT IMAGING | Age: 29
End: 2024-04-24
Payer: COMMERCIAL

## 2024-04-24 ENCOUNTER — HOSPITAL ENCOUNTER (EMERGENCY)
Age: 29
Discharge: HOME | End: 2024-04-24
Attending: EMERGENCY MEDICINE
Payer: COMMERCIAL

## 2024-04-24 VITALS
WEIGHT: 263.89 LBS | TEMPERATURE: 98.8 F | HEIGHT: 60 IN | DIASTOLIC BLOOD PRESSURE: 93 MMHG | SYSTOLIC BLOOD PRESSURE: 143 MMHG | BODY MASS INDEX: 51.81 KG/M2 | RESPIRATION RATE: 18 BRPM | OXYGEN SATURATION: 95 % | HEART RATE: 85 BPM

## 2024-04-24 DIAGNOSIS — N39.0 URINARY TRACT INFECTION WITHOUT HEMATURIA, SITE UNSPECIFIED: Primary | ICD-10-CM

## 2024-04-24 LAB
BACTERIA URNS QL MICRO: ABNORMAL /HPF
BILIRUB UR QL STRIP.AUTO: NEGATIVE
CLARITY UR: CLEAR
COLOR UR: YELLOW
EPI CELLS #/AREA URNS HPF: ABNORMAL /HPF (ref 0–5)
GLUCOSE UR STRIP.AUTO-MCNC: NEGATIVE MG/DL
HCG UR QL: NEGATIVE
HGB UR QL STRIP.AUTO: ABNORMAL
KETONES UR STRIP.AUTO-MCNC: NEGATIVE MG/DL
LEUKOCYTE ESTERASE UR QL STRIP.AUTO: ABNORMAL
NITRITE UR QL STRIP.AUTO: POSITIVE
PH UR STRIP.AUTO: 7 [PH] (ref 5–8)
PROT UR STRIP.AUTO-MCNC: NEGATIVE MG/DL
RBC #/AREA URNS HPF: ABNORMAL /HPF (ref 0–4)
SP GR UR STRIP.AUTO: 1.02 (ref 1–1.03)
TRICHOMONAS #/AREA URNS HPF: ABNORMAL /HPF
UA COMPLETE W REFLEX CULTURE PNL UR: YES
UA DIPSTICK W REFLEX MICRO PNL UR: YES
URN SPEC COLLECT METH UR: ABNORMAL
UROBILINOGEN UR STRIP-ACNC: 0.2 E.U./DL
WBC #/AREA URNS HPF: ABNORMAL /HPF (ref 0–5)

## 2024-04-24 PROCEDURE — 87077 CULTURE AEROBIC IDENTIFY: CPT

## 2024-04-24 PROCEDURE — 99284 EMERGENCY DEPT VISIT MOD MDM: CPT

## 2024-04-24 PROCEDURE — 6370000000 HC RX 637 (ALT 250 FOR IP): Performed by: EMERGENCY MEDICINE

## 2024-04-24 PROCEDURE — 87186 SC STD MICRODIL/AGAR DIL: CPT

## 2024-04-24 PROCEDURE — 84703 CHORIONIC GONADOTROPIN ASSAY: CPT

## 2024-04-24 PROCEDURE — 87086 URINE CULTURE/COLONY COUNT: CPT

## 2024-04-24 PROCEDURE — 74176 CT ABD & PELVIS W/O CONTRAST: CPT

## 2024-04-24 PROCEDURE — 81001 URINALYSIS AUTO W/SCOPE: CPT

## 2024-04-24 RX ORDER — IBUPROFEN 800 MG/1
800 TABLET ORAL ONCE
Status: COMPLETED | OUTPATIENT
Start: 2024-04-24 | End: 2024-04-24

## 2024-04-24 RX ORDER — NITROFURANTOIN 25; 75 MG/1; MG/1
100 CAPSULE ORAL 2 TIMES DAILY
Qty: 10 CAPSULE | Refills: 0 | Status: SHIPPED | OUTPATIENT
Start: 2024-04-24 | End: 2024-04-29

## 2024-04-24 RX ADMIN — IBUPROFEN 800 MG: 800 TABLET, FILM COATED ORAL at 21:25

## 2024-04-24 NOTE — ED NOTES
Patient presents with pelvic pain that she states radiates down into her L leg. States when she rubs it, it relieves the pain. States she has hx of PCOS, and has not had a period in a year. Negative HPT. States she doesn't know if she has ovarian cysts. States she does not have an OB currently.

## 2024-04-25 NOTE — ED NOTES
Patient DCed from ED at this time. Discussed AVS, follow up, and scripts. They verbalized understanding. Reinforced that should symptoms persist or worsen to return to the ED. They verbalized understanding. Patient ambulated out of ED. RN thanked patient for choosing [facility].

## 2024-04-25 NOTE — ED PROVIDER NOTES
[facility]  EMERGENCY DEPARTMENT ENCOUNTER        Pt Name: Anna Gray  MRN: [id number]  Birthdate 1995  Date of evaluation: 4/24/2024  Provider: Patria Agustin MD  PCP: Nicholas Banks  Note Started: 9:16 PM EDT 4/24/24    CHIEF COMPLAINT       Chief Complaint   Patient presents with    Pelvic Pain       HISTORY OF PRESENT ILLNESS: 1 or more Elements     History from : Patient    Limitations to history : None    Anna Gray is a 29 y.o. female who presents to the emergency department with left lower abdominal pain.  She states she has a history of PCOS and this feels similar to some of her attacks.  She also complains of pain to the suprapubic area.  No vaginal bleeding or discharge, dysuria, hematuria, fever, chills, nausea, vomiting.    Nursing Notes were all reviewed and agreed with or any disagreements were addressed in the HPI.    REVIEW OF SYSTEMS :      Review of Systems    10 systems reviewed and negative except as in HPI/MDM    SURGICAL HISTORY     Past Surgical History:   Procedure Laterality Date    FIBULA FRACTURE SURGERY Right 4/29/2019    OPEN REDUCTION INTERNAL FIXATION RIGHT FIBULAR SHAFT WITH C-ARM performed by Melisa Love MD at [facility] OR       CURRENTMEDICATIONS       Previous Medications    AIMOVIG 70 MG/ML SOAJ        ESCITALOPRAM (LEXAPRO) 20 MG TABLET        METOPROLOL SUCCINATE (TOPROL XL) 25 MG EXTENDED RELEASE TABLET    Take 1 tablet by mouth daily    NAPROXEN (NAPROSYN) 500 MG TABLET    Take 1 tablet by mouth 2 times daily (with meals)    TOPIRAMATE (TOPAMAX) 50 MG TABLET    Take 1.5 tablets by mouth 2 times daily       ALLERGIES     Peanut oil and Nickel    FAMILYHISTORY       Family History   Problem Relation Age of Onset    Cancer Mother     Cancer Father     Heart Disease Father         SOCIAL HISTORY       Social History     Tobacco Use    Smoking status: Never    Smokeless tobacco: Never   Vaping Use    Vaping Use: Never used   Substance

## 2024-04-27 LAB
BACTERIA UR CULT: ABNORMAL
BACTERIA UR CULT: ABNORMAL
ORGANISM: ABNORMAL
ORGANISM: ABNORMAL

## 2024-04-27 NOTE — RESULT ENCOUNTER NOTE
Culture reviewed, culture is positive, patient was discharged on an appropriate antibiotic. No further action needed.

## (undated) DEVICE — PADDING UNDERCAST W4INXL4YD 100% COT CRIMPED FINISH WBRL II

## (undated) DEVICE — Device

## (undated) DEVICE — SUTURE MCRYL SZ 4-0 L18IN ABSRB UD L19MM PS-2 3/8 CIR PRIM Y496G

## (undated) DEVICE — SPONGE GZ W4XL4IN COT 12 PLY TYP VII WVN C FLD DSGN

## (undated) DEVICE — Z DISCONTINUED USE 2275686 GLOVE SURG SZ 8 L12IN FNGR THK13MIL WHT ISOLEX POLYISOPRENE

## (undated) DEVICE — SUTURE VCRL SZ 3-0 L18IN ABSRB UD L26MM SH 1/2 CIR J864D

## (undated) DEVICE — ELECTRODE PT RET AD L9FT HI MOIST COND ADH HYDRGEL CORDED

## (undated) DEVICE — KIT OR ROOM TURNOVER W/STRAP

## (undated) DEVICE — CANISTER, RIGID, 1200CC: Brand: MEDLINE INDUSTRIES, INC.

## (undated) DEVICE — 3M™ STERI-STRIP™ COMPOUND BENZOIN TINCTURE 40 BAGS/CARTON 4 CARTONS/CASE C1544: Brand: 3M™ STERI-STRIP™

## (undated) DEVICE — COVER LT HNDL BLU PLAS

## (undated) DEVICE — SOLUTION IV IRRIG POUR BRL 0.9% SODIUM CHL 2F7124

## (undated) DEVICE — SHEET,DRAPE,53X77,STERILE: Brand: MEDLINE

## (undated) DEVICE — MAJOR SET UP PK

## (undated) DEVICE — GLOVE SURG SZ 6 L12IN FNGR THK87MIL WHT LTX FREE

## (undated) DEVICE — GOWN SIRUS NONREIN XL W/TWL: Brand: MEDLINE INDUSTRIES, INC.

## (undated) DEVICE — SUTURE VCRL SZ 2-0 L18IN ABSRB UD CT-1 L36MM 1/2 CIR J839D

## (undated) DEVICE — STRIP,CLOSURE,WOUND,MEDI-STRIP,1/2X4: Brand: MEDLINE

## (undated) DEVICE — BANDAGE COMPR W6INXL10YD ST M E WHITE/BEIGE

## (undated) DEVICE — TOWEL,OR,DSP,ST,BLUE,STD,4/PK,20PK/CS: Brand: MEDLINE

## (undated) DEVICE — ZIMMER® STERILE DISPOSABLE TOURNIQUET CUFF WITH PLC, DUAL PORT, SINGLE BLADDER, 18 IN. (46 CM)

## (undated) DEVICE — GLOVE SURG SZ 8 L12IN FNGR THK87MIL WHT LTX FREE

## (undated) DEVICE — NEEDLE HYPO 22GA L1 1/2IN PIVOTING SHLD FOR LUERLOCK SYR

## (undated) DEVICE — COTTON UNDERCAST PADDING,CRIMPED FINISH: Brand: WEBRIL

## (undated) DEVICE — DRAPE,U/SHT,SPLIT,FILM,60X84,STERILE: Brand: MEDLINE

## (undated) DEVICE — CHLORAPREP 26ML ORANGE

## (undated) DEVICE — BIT DRL DIA2.7MM CANN QUIK CPL

## (undated) DEVICE — BANDAGE COMPR W6INXL12FT SMOOTH FOR LIMB EXSANG ESMARCH

## (undated) DEVICE — GUIDEWIRE ORTH L6IN DIA1.6MM PART THRD TIP FOR CANN SCR

## (undated) DEVICE — T-DRAPE,EXTREMITY,STERILE: Brand: MEDLINE

## (undated) DEVICE — SYRINGE MED 10ML LUERLOCK TIP W/O SFTY DISP

## (undated) DEVICE — DRESSING,GAUZE,XEROFORM,CURAD,1"X8",ST: Brand: CURAD

## (undated) DEVICE — BIT DRL L100MM DIA2MM QUIK CONN W/O STP N RADLUC REUSE

## (undated) DEVICE — GOWN SIRUS NONREIN LG W/TWL: Brand: MEDLINE INDUSTRIES, INC.

## (undated) DEVICE — GLOVE SURG SZ 6 L12IN FNGR THK87MIL DK GRN LTX FREE ISOLEX

## (undated) DEVICE — DRAPE C ARM UNIV W41XL74IN CLR PLAS XR VELC CLSR POLY STRP

## (undated) DEVICE — BIT DRL L100MM DIA2.5MM FOR SM FRAG UNIV LOK SYS